# Patient Record
Sex: FEMALE | Race: BLACK OR AFRICAN AMERICAN | NOT HISPANIC OR LATINO
[De-identification: names, ages, dates, MRNs, and addresses within clinical notes are randomized per-mention and may not be internally consistent; named-entity substitution may affect disease eponyms.]

---

## 2017-05-03 ENCOUNTER — APPOINTMENT (OUTPATIENT)
Dept: OBGYN | Facility: CLINIC | Age: 32
End: 2017-05-03

## 2017-05-04 ENCOUNTER — RESULT REVIEW (OUTPATIENT)
Age: 32
End: 2017-05-04

## 2017-05-24 ENCOUNTER — OUTPATIENT (OUTPATIENT)
Dept: OUTPATIENT SERVICES | Facility: HOSPITAL | Age: 32
LOS: 1 days | Discharge: HOME | End: 2017-05-24

## 2017-05-24 ENCOUNTER — APPOINTMENT (OUTPATIENT)
Dept: OBGYN | Facility: CLINIC | Age: 32
End: 2017-05-24

## 2017-05-24 VITALS
BODY MASS INDEX: 22.82 KG/M2 | SYSTOLIC BLOOD PRESSURE: 100 MMHG | DIASTOLIC BLOOD PRESSURE: 60 MMHG | HEIGHT: 65 IN | WEIGHT: 137 LBS

## 2017-06-28 DIAGNOSIS — Z09 ENCOUNTER FOR FOLLOW-UP EXAMINATION AFTER COMPLETED TREATMENT FOR CONDITIONS OTHER THAN MALIGNANT NEOPLASM: ICD-10-CM

## 2017-11-22 ENCOUNTER — APPOINTMENT (OUTPATIENT)
Dept: OBGYN | Facility: CLINIC | Age: 32
End: 2017-11-22

## 2017-12-20 ENCOUNTER — APPOINTMENT (OUTPATIENT)
Dept: OBGYN | Facility: CLINIC | Age: 32
End: 2017-12-20

## 2018-01-17 ENCOUNTER — EMERGENCY (EMERGENCY)
Facility: HOSPITAL | Age: 33
LOS: 0 days | Discharge: HOME | End: 2018-01-17

## 2018-01-17 DIAGNOSIS — O99.89 OTHER SPECIFIED DISEASES AND CONDITIONS COMPLICATING PREGNANCY, CHILDBIRTH AND THE PUERPERIUM: ICD-10-CM

## 2018-01-17 DIAGNOSIS — J32.9 CHRONIC SINUSITIS, UNSPECIFIED: ICD-10-CM

## 2018-01-17 DIAGNOSIS — J02.9 ACUTE PHARYNGITIS, UNSPECIFIED: ICD-10-CM

## 2018-01-17 DIAGNOSIS — R50.9 FEVER, UNSPECIFIED: ICD-10-CM

## 2018-01-24 ENCOUNTER — APPOINTMENT (OUTPATIENT)
Dept: OBGYN | Facility: CLINIC | Age: 33
End: 2018-01-24

## 2018-01-24 ENCOUNTER — RESULT CHARGE (OUTPATIENT)
Age: 33
End: 2018-01-24

## 2018-01-24 ENCOUNTER — OUTPATIENT (OUTPATIENT)
Dept: OUTPATIENT SERVICES | Facility: HOSPITAL | Age: 33
LOS: 1 days | Discharge: HOME | End: 2018-01-24

## 2018-01-24 VITALS — DIASTOLIC BLOOD PRESSURE: 70 MMHG | WEIGHT: 132 LBS | BODY MASS INDEX: 21.97 KG/M2 | SYSTOLIC BLOOD PRESSURE: 100 MMHG

## 2018-01-25 ENCOUNTER — RESULT REVIEW (OUTPATIENT)
Age: 33
End: 2018-01-25

## 2018-01-25 LAB — HCG UR QL: NEGATIVE

## 2018-02-04 DIAGNOSIS — O99.89 OTHER SPECIFIED DISEASES AND CONDITIONS COMPLICATING PREGNANCY, CHILDBIRTH AND THE PUERPERIUM: ICD-10-CM

## 2018-02-14 ENCOUNTER — OUTPATIENT (OUTPATIENT)
Dept: OUTPATIENT SERVICES | Facility: HOSPITAL | Age: 33
LOS: 1 days | Discharge: HOME | End: 2018-02-14

## 2018-02-14 ENCOUNTER — APPOINTMENT (OUTPATIENT)
Dept: OBGYN | Facility: CLINIC | Age: 33
End: 2018-02-14

## 2018-02-14 VITALS — DIASTOLIC BLOOD PRESSURE: 70 MMHG | SYSTOLIC BLOOD PRESSURE: 122 MMHG | WEIGHT: 133 LBS | BODY MASS INDEX: 22.13 KG/M2

## 2018-02-15 DIAGNOSIS — R87.811 VAGINAL HIGH RISK HUMAN PAPILLOMAVIRUS (HPV) DNA TEST POSITIVE: ICD-10-CM

## 2018-07-18 ENCOUNTER — LABORATORY RESULT (OUTPATIENT)
Age: 33
End: 2018-07-18

## 2018-07-18 ENCOUNTER — RESULT CHARGE (OUTPATIENT)
Age: 33
End: 2018-07-18

## 2018-07-18 ENCOUNTER — OUTPATIENT (OUTPATIENT)
Dept: OUTPATIENT SERVICES | Facility: HOSPITAL | Age: 33
LOS: 1 days | Discharge: HOME | End: 2018-07-18

## 2018-07-18 ENCOUNTER — APPOINTMENT (OUTPATIENT)
Dept: OBGYN | Facility: CLINIC | Age: 33
End: 2018-07-18

## 2018-07-18 VITALS — WEIGHT: 133 LBS | SYSTOLIC BLOOD PRESSURE: 116 MMHG | DIASTOLIC BLOOD PRESSURE: 70 MMHG | BODY MASS INDEX: 22.13 KG/M2

## 2018-07-19 DIAGNOSIS — N87.1 MODERATE CERVICAL DYSPLASIA: ICD-10-CM

## 2018-07-19 LAB
HCG UR QL: NEGATIVE
QUALITY CONTROL: YES

## 2018-08-08 ENCOUNTER — OUTPATIENT (OUTPATIENT)
Dept: OUTPATIENT SERVICES | Facility: HOSPITAL | Age: 33
LOS: 1 days | Discharge: HOME | End: 2018-08-08

## 2018-08-08 ENCOUNTER — APPOINTMENT (OUTPATIENT)
Dept: OBGYN | Facility: CLINIC | Age: 33
End: 2018-08-08

## 2018-08-08 VITALS
DIASTOLIC BLOOD PRESSURE: 68 MMHG | SYSTOLIC BLOOD PRESSURE: 116 MMHG | BODY MASS INDEX: 21.66 KG/M2 | WEIGHT: 130 LBS | HEIGHT: 65 IN

## 2018-08-08 DIAGNOSIS — O34.41 MATERNAL CARE FOR OTHER ABNORMALITIES OF CERVIX, FIRST TRIMESTER: ICD-10-CM

## 2018-08-08 DIAGNOSIS — Z98.890 MATERNAL CARE FOR OTHER ABNORMALITIES OF CERVIX, FIRST TRIMESTER: ICD-10-CM

## 2018-08-09 DIAGNOSIS — N72 INFLAMMATORY DISEASE OF CERVIX UTERI: ICD-10-CM

## 2018-12-04 ENCOUNTER — APPOINTMENT (OUTPATIENT)
Dept: OBGYN | Facility: CLINIC | Age: 33
End: 2018-12-04
Payer: MEDICAID

## 2018-12-04 PROCEDURE — 76817 TRANSVAGINAL US OBSTETRIC: CPT

## 2018-12-04 PROCEDURE — 99215 OFFICE O/P EST HI 40 MIN: CPT | Mod: 25

## 2018-12-18 ENCOUNTER — APPOINTMENT (OUTPATIENT)
Dept: OBGYN | Facility: CLINIC | Age: 33
End: 2018-12-18
Payer: MEDICAID

## 2018-12-18 PROCEDURE — 76801 OB US < 14 WKS SINGLE FETUS: CPT

## 2019-01-08 ENCOUNTER — APPOINTMENT (OUTPATIENT)
Dept: OBGYN | Facility: CLINIC | Age: 34
End: 2019-01-08
Payer: MEDICAID

## 2019-01-08 PROCEDURE — 0502F SUBSEQUENT PRENATAL CARE: CPT

## 2019-01-15 ENCOUNTER — APPOINTMENT (OUTPATIENT)
Dept: ANTEPARTUM | Facility: CLINIC | Age: 34
End: 2019-01-15

## 2019-01-15 ENCOUNTER — OUTPATIENT (OUTPATIENT)
Dept: OUTPATIENT SERVICES | Facility: HOSPITAL | Age: 34
LOS: 1 days | Discharge: HOME | End: 2019-01-15

## 2019-01-15 DIAGNOSIS — Z36.82 ENCOUNTER FOR ANTENATAL SCREENING FOR NUCHAL TRANSLUCENCY: ICD-10-CM

## 2019-01-15 DIAGNOSIS — O35.1XX1 MATERNAL CARE FOR (SUSPECTED) CHROMOSOMAL ABNORMALITY IN FETUS, FETUS 1: ICD-10-CM

## 2019-01-15 DIAGNOSIS — Z3A.12 12 WEEKS GESTATION OF PREGNANCY: ICD-10-CM

## 2019-02-05 ENCOUNTER — APPOINTMENT (OUTPATIENT)
Dept: OBGYN | Facility: CLINIC | Age: 34
End: 2019-02-05
Payer: MEDICAID

## 2019-02-05 PROCEDURE — 0502F SUBSEQUENT PRENATAL CARE: CPT

## 2019-02-20 ENCOUNTER — OUTPATIENT (OUTPATIENT)
Dept: OUTPATIENT SERVICES | Facility: HOSPITAL | Age: 34
LOS: 1 days | Discharge: HOME | End: 2019-02-20

## 2019-02-20 DIAGNOSIS — Z02.9 ENCOUNTER FOR ADMINISTRATIVE EXAMINATIONS, UNSPECIFIED: ICD-10-CM

## 2019-02-28 ENCOUNTER — OUTPATIENT (OUTPATIENT)
Dept: OUTPATIENT SERVICES | Facility: HOSPITAL | Age: 34
LOS: 1 days | Discharge: HOME | End: 2019-02-28

## 2019-02-28 VITALS — HEART RATE: 110 BPM | DIASTOLIC BLOOD PRESSURE: 83 MMHG | SYSTOLIC BLOOD PRESSURE: 126 MMHG | RESPIRATION RATE: 20 BRPM

## 2019-02-28 VITALS — TEMPERATURE: 99 F

## 2019-02-28 DIAGNOSIS — M41.9 SCOLIOSIS, UNSPECIFIED: Chronic | ICD-10-CM

## 2019-02-28 NOTE — OB PROVIDER TRIAGE NOTE - NS_OBGYNHISTORY_OBGYN_ALL_OB_FT
OB: NSVDX3 @[facility], largest baby 7lbs. No complications  Gyn: Denies history of abnormal papsmears, STIs, uterine fibroids or ovarian cysts. HPV+ 2017, Colpo normal.

## 2019-02-28 NOTE — OB PROVIDER TRIAGE NOTE - NSHPPHYSICALEXAM_GEN_ALL_CORE
Vital Signs Last 24 Hrs  T(C): 37.4 (28 Feb 2019 22:00), Max: 37.4 (28 Feb 2019 22:00)  T(F): 99.3 (28 Feb 2019 22:00), Max: 99.3 (28 Feb 2019 22:00)  HR: 110 (28 Feb 2019 22:21) (110 - 110)  BP: 126/83 (28 Feb 2019 22:21) (126/83 - 126/83)  RR: 20 (28 Feb 2019 22:21) (20 - 20)    FHR 150bpm per sonogram  SVE deferred    gen: A+OX3. NAD  Abd: Soft, LLQ tenderness to moderate palpation, left sided rovsings and obturator sign positive. Nondistended. no rigidity. no rebound tenderness. No CVA tenderness.   LE: +1 LE edema. Vital Signs Last 24 Hrs  T(C): 37.4 (28 Feb 2019 22:00), Max: 37.4 (28 Feb 2019 22:00)  T(F): 99.3 (28 Feb 2019 22:00), Max: 99.3 (28 Feb 2019 22:00)  HR: 110 (28 Feb 2019 22:21) (110 - 110)  BP: 126/83 (28 Feb 2019 22:21) (126/83 - 126/83)  RR: 20 (28 Feb 2019 22:21) (20 - 20)    FHR 150bpm per sonogram  SVE deferred    UDIP: ketones+    gen: A+OX3. NAD  Abd: Soft, LLQ tenderness to moderate palpation. Nondistended. no rigidity. no rebound tenderness. No CVA tenderness. No suprapubic tenderness on exam.   LE: +1 LE edema, equal bilaterally. Vital Signs Last 24 Hrs  T(C): 37.4 (28 Feb 2019 22:00), Max: 37.4 (28 Feb 2019 22:00)  T(F): 99.3 (28 Feb 2019 22:00), Max: 99.3 (28 Feb 2019 22:00)  HR: 110 (28 Feb 2019 22:21) (110 - 110)  BP: 126/83 (28 Feb 2019 22:21) (126/83 - 126/83)  RR: 20 (28 Feb 2019 22:21) (20 - 20)    Negative for consitutional, heent, cardio, resp, gi, gu, ext, skin, neuro, psychiatric symptoms    FHR 150bpm per sonogram  SVE Pt refusing    UDIP: ketones+    gen: A+OX3. NAD  Abd: Soft, LLQ tenderness to moderate palpation. Nondistended. no rigidity. no rebound tenderness. No CVA tenderness. No suprapubic tenderness on exam.   LE: +1 LE edema, equal bilaterally.

## 2019-02-28 NOTE — OB PROVIDER TRIAGE NOTE - NSOBPROVIDERNOTE_OBGYN_ALL_OB_FT
33yo 33yo , at 18w3d, with LLQ pain, no concern for  labor at this time, rule out other GI or infectious etiology.  -CBC, UA, Urine culture  -Official sonogram   -IV Fluid hydration    Dr. Mcnair aware, Dr. Garcia to be aware 35yo , at 18w3d, with LLQ pain, no concern for  labor at this time, rule out other GI or infectious etiology.  -CBC, UA, Urine culture  -Official sonogram   -IV Fluid hydration    Dr. Mcnair aware, Dr. Garcia to be aware 33yo , at 18w3d, with LLQ pain, rule out  labor vs other GI or infectious etiology.  -CBC, UA, Urine culture  -IV Fluid hydration    Dr. Mcnair aware, Dr. Garcia to be aware 33yo , at 18w3d, with intermittent LLQ pain, worse with ambulation, now resolved since arrival to L+D, not in  labor, likely musculoskeletal in etiology.   -rest, ice, heating packs, and tylenol PRN  -Discharge with labor precautions  -Follow up with Dr. Lubin on    -Fetal Kick Counts encouraged     Dr. Lubin aware, Dr. Mcnair aware 33yo , at 18w3d, with intermittent LLQ pain, worse with ambulation, now resolved since arrival to L+D, not in  labor, likely musculoskeletal in etiology.   -rest, ice, heating packs, and tylenol PRN  -Discharge with labor precautions  -Follow up with Dr. Lubin on    -Fetal Kick Counts encouraged     Dr. Lubin aware, Dr. Mcnair aware    Attending: called to review case  pt seen and examined with resident and agree with above plan of care  pt refusing vaginal exam-incomplete assessment due to this  considering pain is not present now, low suspicion for serious pathology. Pt made aware ab can be misses due to lack of ve  precautions given  sono +FH

## 2019-02-28 NOTE — OB PROVIDER TRIAGE NOTE - HISTORY OF PRESENT ILLNESS
35yo , at 18w3d dated by 1st trimester sonogram, with back pain and cramping. She reports the back pain started yesterday morning, intermittent accompanied by suprapubic pain this morning, both intermittent. She reports the pain at 6/10 currently, sharp, this morning was 10/10. No relief with tylenol, some relief with sitting up, and made worse by laying down and ambulation. She reports nausea that started today, but not vomiting. Reports some SOB with ambulation accompanied by sweating and a feverish feeling. Denies HA, vision changes, CP, SOB, RUQ pain/ Epigastric pain, vomiting, LE pain/ swelling, diarrhea, pain/difficulty with urination, recorded fevers or chills. Last sexual intercourse 2 weeks ago and last PO intake 2100, pasta. Last bowel movement today, normal, denies melena or BRBPR.  Reports passing gas all day. No complications this pregnancy. Denies LOF, vaginal bleeding and denies fetal movement as of yet. 33yo , at 18w3d dated by 1st trimester sonogram, with back pain and cramping. She reports the back pain started yesterday morning, intermittent accompanied by suprapubic pain this morning, both intermittent. She reports the pain has since resolved, and was previously 6/10. No relief with tylenol, some relief with sitting up, and made worse by laying down and ambulation. She reports nausea that started today, but not vomiting. Reports some SOB with ambulation accompanied by sweating and a feverish feeling. Denies HA, vision changes, CP, SOB, RUQ pain/ Epigastric pain, vomiting, LE pain/ swelling, diarrhea, pain/difficulty with urination, recorded fevers or chills. Last sexual intercourse 2 weeks ago and last PO intake 2100, pasta. Last bowel movement today, normal, denies melena or BRBPR.  Reports passing gas all day. No complications this pregnancy. Denies LOF, vaginal bleeding and denies fetal movement as of yet. 35yo , at 18w3d dated by 1st trimester sonogram, with back pain and cramping. She reports the back pain started yesterday morning, intermittent accompanied by suprapubic pain this morning, both intermittent. She reports the pain has since resolved, and was previously 6/10. No relief with tylenol, some relief with sitting up, and made worse by laying down and ambulation. She reports nausea that started today, but not vomiting. Denies HA, vision changes, CP, SOB, RUQ pain/ Epigastric pain, vomiting, LE pain/ swelling, diarrhea, pain/difficulty with urination, recorded fevers or chills. Last sexual intercourse 2 weeks ago and last PO intake 2100, pasta. Last bowel movement today, normal, denies melena or BRBPR.  Reports passing gas all day. No complications this pregnancy. Denies LOF, vaginal bleeding and denies fetal movement as of yet.

## 2019-03-05 ENCOUNTER — APPOINTMENT (OUTPATIENT)
Dept: OBGYN | Facility: CLINIC | Age: 34
End: 2019-03-05
Payer: MEDICAID

## 2019-03-05 PROBLEM — M41.9 SCOLIOSIS, UNSPECIFIED: Chronic | Status: ACTIVE | Noted: 2019-02-28

## 2019-03-05 PROCEDURE — 0502F SUBSEQUENT PRENATAL CARE: CPT

## 2019-03-12 ENCOUNTER — OUTPATIENT (OUTPATIENT)
Dept: OUTPATIENT SERVICES | Facility: HOSPITAL | Age: 34
LOS: 1 days | Discharge: HOME | End: 2019-03-12

## 2019-03-12 ENCOUNTER — APPOINTMENT (OUTPATIENT)
Dept: ANTEPARTUM | Facility: CLINIC | Age: 34
End: 2019-03-12

## 2019-03-12 DIAGNOSIS — M41.9 SCOLIOSIS, UNSPECIFIED: Chronic | ICD-10-CM

## 2019-04-02 ENCOUNTER — APPOINTMENT (OUTPATIENT)
Dept: OBGYN | Facility: CLINIC | Age: 34
End: 2019-04-02
Payer: MEDICAID

## 2019-04-02 PROCEDURE — 0502F SUBSEQUENT PRENATAL CARE: CPT

## 2019-04-28 ENCOUNTER — OUTPATIENT (OUTPATIENT)
Dept: OUTPATIENT SERVICES | Facility: HOSPITAL | Age: 34
LOS: 1 days | Discharge: HOME | End: 2019-04-28

## 2019-04-28 VITALS — HEART RATE: 85 BPM | SYSTOLIC BLOOD PRESSURE: 113 MMHG | DIASTOLIC BLOOD PRESSURE: 70 MMHG

## 2019-04-28 VITALS — TEMPERATURE: 98 F

## 2019-04-28 DIAGNOSIS — M41.9 SCOLIOSIS, UNSPECIFIED: Chronic | ICD-10-CM

## 2019-04-28 NOTE — OB PROVIDER TRIAGE NOTE - NSHPPHYSICALEXAM_GEN_ALL_CORE
Physical exam:    Vital Signs Last 24 Hrs  T(F): 98.4 (28 Apr 2019 19:54), Max: 98.4 (28 Apr 2019 19:54)  HR: 85 (28 Apr 2019 21:02) (85 - 110)  BP: 113/70 (28 Apr 2019 21:02) (107/67 - 139/89)  RR: 16 (28 Apr 2019 20:01) (16 - 16)  Udip mod leuks  Gen: NAD  Abdomen: Soft, nontender, gravid.   Speculum: cervix appears closed. White thick discharge- candida appearing, foul smelling seen in the vaginal vault. no lesion seen  SVE cl/l/p, variable lie, ant placenta, MVP 6.22cm, CL 4.50cm  EFM: 140/mod/+accels  toco: no contractions

## 2019-04-28 NOTE — OB PROVIDER TRIAGE NOTE - NSOBPROVIDERNOTE_OBGYN_ALL_OB_FT
35yo  at 26w6d by LMP, GBS unknown, with likely candida infection given symptoms and exam findings, not in  labor    -Terazole for 7 days sent to pharmacy  -PTL precautions given  -FKC counseling given  -Discharge home  -Follow up with PMD on     Dr. Leonard and Dr. jacinto aware

## 2019-04-28 NOTE — OB PROVIDER TRIAGE NOTE - HISTORY OF PRESENT ILLNESS
33yo  at 26w6d with EDC 19 dated by LMP  c/w first trimester sonogram per patient, presents to labor and delivery for discharge and itching. She reports white, cottage cheese appearing, foul smelling discharge since Friday, with vaginal pruritis since then worsening in intensity that patient opted to come to Triage and not PMD's office on Tuesday. She denies contractions, leakage of fluid, vaginal bleeding. Good fetal movement. Denies fevers, chills, dysuria, changes in bowel habits, N/V, extremity swelling or pain. Denies any complications in this pregnancy.

## 2019-04-28 NOTE — OB PROVIDER TRIAGE NOTE - NS_OBGYNHISTORY_OBGYN_ALL_OB_FT
OBHx  NSVDx3  full term, no complications, 7lbs    Gynhx  Hx of abnormal pap smears s/p LEEP in 2017 HSIL, endocervical negative for dysplasia  Denies fibroids, ovarian cysts, STIs

## 2019-04-30 ENCOUNTER — APPOINTMENT (OUTPATIENT)
Dept: OBGYN | Facility: CLINIC | Age: 34
End: 2019-04-30
Payer: MEDICAID

## 2019-04-30 PROCEDURE — 0502F SUBSEQUENT PRENATAL CARE: CPT

## 2019-05-14 ENCOUNTER — OUTPATIENT (OUTPATIENT)
Dept: OUTPATIENT SERVICES | Facility: HOSPITAL | Age: 34
LOS: 1 days | Discharge: HOME | End: 2019-05-14

## 2019-05-14 DIAGNOSIS — R73.09 OTHER ABNORMAL GLUCOSE: ICD-10-CM

## 2019-05-14 DIAGNOSIS — M41.9 SCOLIOSIS, UNSPECIFIED: Chronic | ICD-10-CM

## 2019-05-20 ENCOUNTER — APPOINTMENT (OUTPATIENT)
Dept: INTERNAL MEDICINE | Facility: CLINIC | Age: 34
End: 2019-05-20

## 2019-05-28 ENCOUNTER — APPOINTMENT (OUTPATIENT)
Dept: OBGYN | Facility: CLINIC | Age: 34
End: 2019-05-28
Payer: MEDICAID

## 2019-05-28 PROCEDURE — 0502F SUBSEQUENT PRENATAL CARE: CPT

## 2019-06-11 ENCOUNTER — APPOINTMENT (OUTPATIENT)
Dept: OBGYN | Facility: CLINIC | Age: 34
End: 2019-06-11
Payer: MEDICAID

## 2019-06-11 PROCEDURE — 0502F SUBSEQUENT PRENATAL CARE: CPT

## 2019-06-25 ENCOUNTER — APPOINTMENT (OUTPATIENT)
Dept: OBGYN | Facility: CLINIC | Age: 34
End: 2019-06-25
Payer: MEDICAID

## 2019-06-25 PROCEDURE — 0502F SUBSEQUENT PRENATAL CARE: CPT

## 2019-07-08 ENCOUNTER — APPOINTMENT (OUTPATIENT)
Dept: OBGYN | Facility: CLINIC | Age: 34
End: 2019-07-08

## 2019-07-09 ENCOUNTER — APPOINTMENT (OUTPATIENT)
Dept: OBGYN | Facility: CLINIC | Age: 34
End: 2019-07-09
Payer: MEDICAID

## 2019-07-09 PROCEDURE — 0502F SUBSEQUENT PRENATAL CARE: CPT

## 2019-07-15 ENCOUNTER — APPOINTMENT (OUTPATIENT)
Dept: OBGYN | Facility: CLINIC | Age: 34
End: 2019-07-15
Payer: MEDICAID

## 2019-07-15 PROCEDURE — 0502F SUBSEQUENT PRENATAL CARE: CPT

## 2019-07-22 ENCOUNTER — APPOINTMENT (OUTPATIENT)
Dept: OBGYN | Facility: CLINIC | Age: 34
End: 2019-07-22
Payer: MEDICAID

## 2019-07-22 PROCEDURE — 0502F SUBSEQUENT PRENATAL CARE: CPT

## 2019-07-30 ENCOUNTER — APPOINTMENT (OUTPATIENT)
Dept: OBGYN | Facility: CLINIC | Age: 34
End: 2019-07-30
Payer: MEDICAID

## 2019-07-30 PROCEDURE — 0502F SUBSEQUENT PRENATAL CARE: CPT

## 2019-07-30 PROCEDURE — 59426 ANTEPARTUM CARE ONLY: CPT

## 2019-07-31 ENCOUNTER — INPATIENT (INPATIENT)
Facility: HOSPITAL | Age: 34
LOS: 1 days | Discharge: HOME HEALTH | End: 2019-08-02
Attending: OBSTETRICS & GYNECOLOGY | Admitting: OBSTETRICS & GYNECOLOGY
Payer: MEDICAID

## 2019-07-31 VITALS
DIASTOLIC BLOOD PRESSURE: 86 MMHG | SYSTOLIC BLOOD PRESSURE: 141 MMHG | TEMPERATURE: 99 F | HEART RATE: 92 BPM | RESPIRATION RATE: 19 BRPM

## 2019-07-31 DIAGNOSIS — M41.9 SCOLIOSIS, UNSPECIFIED: Chronic | ICD-10-CM

## 2019-07-31 LAB
ALBUMIN SERPL ELPH-MCNC: 3.5 G/DL — SIGNIFICANT CHANGE UP (ref 3.5–5.2)
ALP SERPL-CCNC: 129 U/L — HIGH (ref 30–115)
ALT FLD-CCNC: 10 U/L — SIGNIFICANT CHANGE UP (ref 0–41)
ANION GAP SERPL CALC-SCNC: 14 MMOL/L — SIGNIFICANT CHANGE UP (ref 7–14)
APPEARANCE UR: CLEAR — SIGNIFICANT CHANGE UP
AST SERPL-CCNC: 27 U/L — SIGNIFICANT CHANGE UP (ref 0–41)
BACTERIA # UR AUTO: NEGATIVE — SIGNIFICANT CHANGE UP
BASOPHILS # BLD AUTO: 0.02 K/UL — SIGNIFICANT CHANGE UP (ref 0–0.2)
BASOPHILS NFR BLD AUTO: 0.2 % — SIGNIFICANT CHANGE UP (ref 0–1)
BILIRUB SERPL-MCNC: 0.2 MG/DL — SIGNIFICANT CHANGE UP (ref 0.2–1.2)
BILIRUB UR-MCNC: NEGATIVE — SIGNIFICANT CHANGE UP
BLD GP AB SCN SERPL QL: SIGNIFICANT CHANGE UP
BUN SERPL-MCNC: 6 MG/DL — LOW (ref 10–20)
CALCIUM SERPL-MCNC: 9.4 MG/DL — SIGNIFICANT CHANGE UP (ref 8.5–10.1)
CHLORIDE SERPL-SCNC: 105 MMOL/L — SIGNIFICANT CHANGE UP (ref 98–110)
CO2 SERPL-SCNC: 19 MMOL/L — SIGNIFICANT CHANGE UP (ref 17–32)
COLOR SPEC: SIGNIFICANT CHANGE UP
CREAT SERPL-MCNC: 0.5 MG/DL — LOW (ref 0.7–1.5)
DIFF PNL FLD: ABNORMAL
EOSINOPHIL # BLD AUTO: 0.06 K/UL — SIGNIFICANT CHANGE UP (ref 0–0.7)
EOSINOPHIL NFR BLD AUTO: 0.5 % — SIGNIFICANT CHANGE UP (ref 0–8)
EPI CELLS # UR: 2 /HPF — SIGNIFICANT CHANGE UP (ref 0–5)
GLUCOSE SERPL-MCNC: 78 MG/DL — SIGNIFICANT CHANGE UP (ref 70–99)
GLUCOSE UR QL: NEGATIVE — SIGNIFICANT CHANGE UP
HCT VFR BLD CALC: 34.6 % — LOW (ref 37–47)
HGB BLD-MCNC: 11.6 G/DL — LOW (ref 12–16)
HIV 1 & 2 AB SERPL IA.RAPID: SIGNIFICANT CHANGE UP
HYALINE CASTS # UR AUTO: 0 /LPF — SIGNIFICANT CHANGE UP (ref 0–7)
IMM GRANULOCYTES NFR BLD AUTO: 0.8 % — HIGH (ref 0.1–0.3)
KETONES UR-MCNC: NEGATIVE — SIGNIFICANT CHANGE UP
LDH SERPL L TO P-CCNC: 351 — HIGH (ref 50–242)
LEUKOCYTE ESTERASE UR-ACNC: NEGATIVE — SIGNIFICANT CHANGE UP
LYMPHOCYTES # BLD AUTO: 1.37 K/UL — SIGNIFICANT CHANGE UP (ref 1.2–3.4)
LYMPHOCYTES # BLD AUTO: 10.5 % — LOW (ref 20.5–51.1)
MCHC RBC-ENTMCNC: 30.6 PG — SIGNIFICANT CHANGE UP (ref 27–31)
MCHC RBC-ENTMCNC: 33.5 G/DL — SIGNIFICANT CHANGE UP (ref 32–37)
MCV RBC AUTO: 91.3 FL — SIGNIFICANT CHANGE UP (ref 81–99)
MONOCYTES # BLD AUTO: 0.87 K/UL — HIGH (ref 0.1–0.6)
MONOCYTES NFR BLD AUTO: 6.6 % — SIGNIFICANT CHANGE UP (ref 1.7–9.3)
NEUTROPHILS # BLD AUTO: 10.66 K/UL — HIGH (ref 1.4–6.5)
NEUTROPHILS NFR BLD AUTO: 81.4 % — HIGH (ref 42.2–75.2)
NITRITE UR-MCNC: NEGATIVE — SIGNIFICANT CHANGE UP
NRBC # BLD: 0 /100 WBCS — SIGNIFICANT CHANGE UP (ref 0–0)
PH UR: 7.5 — SIGNIFICANT CHANGE UP (ref 5–8)
PLATELET # BLD AUTO: 131 K/UL — SIGNIFICANT CHANGE UP (ref 130–400)
POTASSIUM SERPL-MCNC: 4.4 MMOL/L — SIGNIFICANT CHANGE UP (ref 3.5–5)
POTASSIUM SERPL-SCNC: 4.4 MMOL/L — SIGNIFICANT CHANGE UP (ref 3.5–5)
PRENATAL SYPHILIS TEST: SIGNIFICANT CHANGE UP
PROT SERPL-MCNC: 6.8 G/DL — SIGNIFICANT CHANGE UP (ref 6–8)
PROT UR-MCNC: NEGATIVE — SIGNIFICANT CHANGE UP
RBC # BLD: 3.79 M/UL — LOW (ref 4.2–5.4)
RBC # FLD: 14.9 % — HIGH (ref 11.5–14.5)
RBC CASTS # UR COMP ASSIST: 3 /HPF — SIGNIFICANT CHANGE UP (ref 0–4)
SODIUM SERPL-SCNC: 138 MMOL/L — SIGNIFICANT CHANGE UP (ref 135–146)
SP GR SPEC: 1.01 — SIGNIFICANT CHANGE UP (ref 1.01–1.02)
URATE SERPL-MCNC: 4.6 MG/DL — SIGNIFICANT CHANGE UP (ref 2.5–7)
UROBILINOGEN FLD QL: SIGNIFICANT CHANGE UP
WBC # BLD: 13.09 K/UL — HIGH (ref 4.8–10.8)
WBC # FLD AUTO: 13.09 K/UL — HIGH (ref 4.8–10.8)
WBC UR QL: 5 /HPF — SIGNIFICANT CHANGE UP (ref 0–5)

## 2019-07-31 PROCEDURE — 59409 OBSTETRICAL CARE: CPT | Mod: U9

## 2019-07-31 RX ORDER — DIBUCAINE 1 %
1 OINTMENT (GRAM) RECTAL EVERY 6 HOURS
Refills: 0 | Status: DISCONTINUED | OUTPATIENT
Start: 2019-07-31 | End: 2019-08-02

## 2019-07-31 RX ORDER — KETOROLAC TROMETHAMINE 30 MG/ML
30 SYRINGE (ML) INJECTION ONCE
Refills: 0 | Status: DISCONTINUED | OUTPATIENT
Start: 2019-07-31 | End: 2019-07-31

## 2019-07-31 RX ORDER — BENZOCAINE 10 %
1 GEL (GRAM) MUCOUS MEMBRANE EVERY 6 HOURS
Refills: 0 | Status: DISCONTINUED | OUTPATIENT
Start: 2019-07-31 | End: 2019-08-02

## 2019-07-31 RX ORDER — AMPICILLIN TRIHYDRATE 250 MG
1 CAPSULE ORAL EVERY 4 HOURS
Refills: 0 | Status: DISCONTINUED | OUTPATIENT
Start: 2019-07-31 | End: 2019-07-31

## 2019-07-31 RX ORDER — AER TRAVELER 0.5 G/1
1 SOLUTION RECTAL; TOPICAL EVERY 4 HOURS
Refills: 0 | Status: DISCONTINUED | OUTPATIENT
Start: 2019-07-31 | End: 2019-08-02

## 2019-07-31 RX ORDER — IBUPROFEN 200 MG
600 TABLET ORAL EVERY 6 HOURS
Refills: 0 | Status: COMPLETED | OUTPATIENT
Start: 2019-07-31 | End: 2020-06-28

## 2019-07-31 RX ORDER — MAGNESIUM HYDROXIDE 400 MG/1
30 TABLET, CHEWABLE ORAL
Refills: 0 | Status: DISCONTINUED | OUTPATIENT
Start: 2019-07-31 | End: 2019-08-02

## 2019-07-31 RX ORDER — DOCUSATE SODIUM 100 MG
100 CAPSULE ORAL
Refills: 0 | Status: DISCONTINUED | OUTPATIENT
Start: 2019-07-31 | End: 2019-08-02

## 2019-07-31 RX ORDER — OXYTOCIN 10 UNIT/ML
333.33 VIAL (ML) INJECTION
Qty: 20 | Refills: 0 | Status: DISCONTINUED | OUTPATIENT
Start: 2019-07-31 | End: 2019-08-02

## 2019-07-31 RX ORDER — SODIUM CHLORIDE 9 MG/ML
3 INJECTION INTRAMUSCULAR; INTRAVENOUS; SUBCUTANEOUS EVERY 8 HOURS
Refills: 0 | Status: DISCONTINUED | OUTPATIENT
Start: 2019-07-31 | End: 2019-08-02

## 2019-07-31 RX ORDER — IBUPROFEN 200 MG
600 TABLET ORAL EVERY 6 HOURS
Refills: 0 | Status: DISCONTINUED | OUTPATIENT
Start: 2019-07-31 | End: 2019-08-02

## 2019-07-31 RX ORDER — LANOLIN
1 OINTMENT (GRAM) TOPICAL EVERY 6 HOURS
Refills: 0 | Status: DISCONTINUED | OUTPATIENT
Start: 2019-07-31 | End: 2019-08-02

## 2019-07-31 RX ORDER — DIPHENHYDRAMINE HCL 50 MG
25 CAPSULE ORAL EVERY 6 HOURS
Refills: 0 | Status: DISCONTINUED | OUTPATIENT
Start: 2019-07-31 | End: 2019-08-02

## 2019-07-31 RX ORDER — AMPICILLIN TRIHYDRATE 250 MG
2 CAPSULE ORAL ONCE
Refills: 0 | Status: COMPLETED | OUTPATIENT
Start: 2019-07-31 | End: 2019-07-31

## 2019-07-31 RX ORDER — OXYTOCIN 10 UNIT/ML
41.67 VIAL (ML) INJECTION
Qty: 20 | Refills: 0 | Status: DISCONTINUED | OUTPATIENT
Start: 2019-07-31 | End: 2019-08-02

## 2019-07-31 RX ORDER — BUTORPHANOL TARTRATE 2 MG/ML
2 INJECTION, SOLUTION INTRAMUSCULAR; INTRAVENOUS ONCE
Refills: 0 | Status: DISCONTINUED | OUTPATIENT
Start: 2019-07-31 | End: 2019-07-31

## 2019-07-31 RX ORDER — SIMETHICONE 80 MG/1
80 TABLET, CHEWABLE ORAL EVERY 4 HOURS
Refills: 0 | Status: DISCONTINUED | OUTPATIENT
Start: 2019-07-31 | End: 2019-08-02

## 2019-07-31 RX ORDER — ACETAMINOPHEN 500 MG
975 TABLET ORAL
Refills: 0 | Status: DISCONTINUED | OUTPATIENT
Start: 2019-07-31 | End: 2019-08-02

## 2019-07-31 RX ORDER — OXYCODONE HYDROCHLORIDE 5 MG/1
5 TABLET ORAL ONCE
Refills: 0 | Status: DISCONTINUED | OUTPATIENT
Start: 2019-07-31 | End: 2019-08-02

## 2019-07-31 RX ORDER — GLYCERIN ADULT
1 SUPPOSITORY, RECTAL RECTAL AT BEDTIME
Refills: 0 | Status: DISCONTINUED | OUTPATIENT
Start: 2019-07-31 | End: 2019-08-02

## 2019-07-31 RX ORDER — ACETAMINOPHEN 500 MG
1000 TABLET ORAL ONCE
Refills: 0 | Status: COMPLETED | OUTPATIENT
Start: 2019-07-31 | End: 2019-07-31

## 2019-07-31 RX ORDER — MORPHINE SULFATE 50 MG/1
4 CAPSULE, EXTENDED RELEASE ORAL ONCE
Refills: 0 | Status: DISCONTINUED | OUTPATIENT
Start: 2019-07-31 | End: 2019-07-31

## 2019-07-31 RX ORDER — OXYCODONE HYDROCHLORIDE 5 MG/1
5 TABLET ORAL
Refills: 0 | Status: DISCONTINUED | OUTPATIENT
Start: 2019-07-31 | End: 2019-08-02

## 2019-07-31 RX ORDER — SODIUM CHLORIDE 9 MG/ML
1000 INJECTION, SOLUTION INTRAVENOUS
Refills: 0 | Status: DISCONTINUED | OUTPATIENT
Start: 2019-07-31 | End: 2019-07-31

## 2019-07-31 RX ADMIN — Medication 30 MILLIGRAM(S): at 18:44

## 2019-07-31 RX ADMIN — OXYCODONE HYDROCHLORIDE 5 MILLIGRAM(S): 5 TABLET ORAL at 22:47

## 2019-07-31 RX ADMIN — Medication 216 GRAM(S): at 12:59

## 2019-07-31 RX ADMIN — Medication 400 MILLIGRAM(S): at 14:00

## 2019-07-31 RX ADMIN — MORPHINE SULFATE 4 MILLIGRAM(S): 50 CAPSULE, EXTENDED RELEASE ORAL at 17:27

## 2019-07-31 RX ADMIN — SODIUM CHLORIDE 3 MILLILITER(S): 9 INJECTION INTRAMUSCULAR; INTRAVENOUS; SUBCUTANEOUS at 22:48

## 2019-07-31 RX ADMIN — Medication 30 MILLIGRAM(S): at 19:14

## 2019-07-31 RX ADMIN — SODIUM CHLORIDE 125 MILLILITER(S): 9 INJECTION, SOLUTION INTRAVENOUS at 14:20

## 2019-07-31 RX ADMIN — Medication 108 GRAM(S): at 16:59

## 2019-07-31 RX ADMIN — BUTORPHANOL TARTRATE 2 MILLIGRAM(S): 2 INJECTION, SOLUTION INTRAMUSCULAR; INTRAVENOUS at 14:06

## 2019-07-31 NOTE — OB PROVIDER H&P - ASSESSMENT
35yo  at 40w2d, GBS pos, with hx of scoliosis s/p surgery with adrian placement, in labor    -Admit to labor and delivery  -IV hydration and clear liquid diet  -ampicillin for GBS ppx  -Cont efm and toco  -anesthesia consult  -Pain management PRN      Dr. Vasquez and Dr. Naveen Chaudhari aware

## 2019-07-31 NOTE — PROGRESS NOTE ADULT - SUBJECTIVE AND OBJECTIVE BOX
PGY 2 Note    S: Pt seen and examined at bedside for labor check. Doing well, experiencing pain with contractions.     Vital Signs Last 24 Hrs  T(F): 98.24 (2019 16:14), Max: 99.3 (2019 12:27)  HR: 86 (2019 17:00) (74 - 93)  BP: 144/93 (2019 17:00) (120/79 - 149/82)  RR: 16 (2019 16:14) (16 - 19)    EFM: 130/mod юлия/+accel  TOCO: q2-4min  SVE: 7/90/-1, vtx, AROM clear    Labs:                        11.6   13.09 )-----------( 131      ( 2019 13:02 )             34.6           138  |  105  |  6<L>  ----------------------------<  78  4.4   |  19  |  0.5<L>    Ca    9.4      2019 13:02    TPro  6.8  /  Alb  3.5  /  TBili  0.2  /  DBili  x   /  AST  27  /  ALT  10  /  AlkPhos  129<H>      ABO RH Interpretation: A POS (19 @ 13:02)    Urinalysis Basic - ( 2019 13:35 )    Color: Light Yellow / Appearance: Clear / S.014 / pH: x  Gluc: x / Ketone: Negative  / Bili: Negative / Urobili: <2 mg/dL   Blood: x / Protein: Negative / Nitrite: Negative   Leuk Esterase: Negative / RBC: 3 /HPF / WBC 5 /HPF   Sq Epi: x / Non Sq Epi: 2 /HPF / Bacteria: Negative        Prenatal Syphilis Test: Nonreact (19 @ 13:05)      Meds:  @1259 ampicillin  @1400 stadol

## 2019-07-31 NOTE — OB PROVIDER H&P - NSHPPHYSICALEXAM_GEN_ALL_CORE
Physical exam:    Vital Signs Last 24 Hrs  T(F): 99.3 (31 Jul 2019 12:27), Max: 99.3 (31 Jul 2019 12:27)  HR: 82 (31 Jul 2019 13:15) (81 - 92)  BP: 149/82 (31 Jul 2019 13:15) (131/93 - 149/82)  RR: 19 (31 Jul 2019 12:27) (19 - 19)  Udip large blood  Gen: NAD  Resp: CTA b/l  Neuro: AAOx3, +2 UE reflexes b/l  Abdomen: Soft, nontender, gravid. moderate palpable contractions. EFW 3400g  Ext: No calf tenderness  Speculum: cervix appears 2cm open. membranes seen. scant brown blood noted in the vagina  VE: 4/80/-2 vertex  EFM: 130/mod/+Accels  toco: q 4-6min

## 2019-07-31 NOTE — OB RN DELIVERY SUMMARY - NS_INTRAPARTUMABXTYPE_OBGYN_ALL_OB
GBS specific antibiotics > 2 hrs prior to birth No antibiotics or any antibiotics < 2 hrs prior to birth

## 2019-07-31 NOTE — OB PROVIDER H&P - NS_OBGYNHISTORY_OBGYN_ALL_OB_FT
Obhx  NSVDx3 full term, no complications, denies PIH, PPH and GDM largest 7lbs    GYNhx  hx of abnormal pap smears s/p colposcopy and Leep in 2017  Denies fibroids, ovarian cysts, STIs

## 2019-07-31 NOTE — OB PROVIDER H&P - NSHPLABSRESULTS_GEN_ALL_CORE
GCT 62    Sonogram  8w1d- CRL consisten, FH 168bp  12w1d- ant placenta, NT 1.58mm  20w1d- EFW 385g, transverse, 3vc, ant placenta, MVP 54mm, anatomy wnl

## 2019-07-31 NOTE — OB RN TRIAGE NOTE - NS_OBACUITYLEVEL_OBGYN_ALL_OB
3
pt was found to be hyperglycemic and dehydrated and given fluids and will be discharged with outpatient follow up/. pt stated that he had not taken his meds today

## 2019-07-31 NOTE — CHART NOTE - NSCHARTNOTEFT_GEN_A_CORE
35 yo  in labor  40wk 4cm  Hx Scoliosis s/p correction with William rods  3 previous deliveries without epidural analgesia  Discussed options with patient including obliteration of epidural space  Offered spinal analgesia with R/B/A  Declined  Requests parenteral analgesia 35 yo  in labor  40wk 4cm  Hx Scoliosis s/p correction with William rods  3 previous deliveries without epidural analgesia  Discussed options with patient including obliteration of epidural space during spinal fusion   Offered spinal analgesia with R/B/A  Declined  Requests parenteral analgesia

## 2019-07-31 NOTE — PROGRESS NOTE ADULT - ASSESSMENT
33 yo  @40w2d, GBS pos, measles unknown, h/o scoliosis w/ adrian placement, gHTN r/o preeclampsia, in active labor, doing well.    - will give IV morphine for pain  - cont EFM and toco  - ampicillin for GBS ppx  - clear liquid diet, IV hydration  - monitor BPs  - f/up rubeola, Upr/cr    Dr/ Bhavana and Dr. Chaudhari aware.

## 2019-07-31 NOTE — OB PROVIDER DELIVERY SUMMARY - NSPROVIDERDELIVERYNOTE_OBGYN_ALL_OB_FT
Patient was fully dilated and pushing. Fetal head was OA and restituted to LOT. The anterior and posterior shoulders delivered, followed by the remaining body atraumatically. Delayed cord clamping was performed, and then clamped and cut. Cord blood gases collected x2. The  was handed to the mother and RN. The placenta delivered intact with membranes. Pitocin was administered. Uterus massaged, fundus found to be firm. Cervix, vagina and perineum inspected no lacerations noted.    Viable female infant delivered, with APGARs 9/9.    Dr. Bateman and Dr. Ignacio present for the delivery Patient was fully dilated and pushing. Fetal head was OA and restituted to LOT. The anterior and posterior shoulders delivered, followed by the remaining body atraumatically. Delayed cord clamping was performed, and then clamped and cut. Cord blood gases collected x2. The  was handed to the mother and RN. The placenta delivered intact with membranes. Pitocin was administered. Uterus massaged, fundus found to be firm. Cervix, vagina and perineum inspected no lacerations noted.    Viable female infant delivered, with APGARs 9/9.    Dr. Bateman and Dr. Ignacio present for the delivery    OB ATTENDING: present for uncomplicated delivery

## 2019-08-01 ENCOUNTER — APPOINTMENT (OUTPATIENT)
Dept: OBGYN | Facility: CLINIC | Age: 34
End: 2019-08-01

## 2019-08-01 LAB
BASOPHILS # BLD AUTO: 0.04 K/UL — SIGNIFICANT CHANGE UP (ref 0–0.2)
BASOPHILS NFR BLD AUTO: 0.3 % — SIGNIFICANT CHANGE UP (ref 0–1)
CREAT ?TM UR-MCNC: 49 MG/DL — SIGNIFICANT CHANGE UP
EOSINOPHIL # BLD AUTO: 0.06 K/UL — SIGNIFICANT CHANGE UP (ref 0–0.7)
EOSINOPHIL NFR BLD AUTO: 0.5 % — SIGNIFICANT CHANGE UP (ref 0–8)
HCT VFR BLD CALC: 30.6 % — LOW (ref 37–47)
HGB BLD-MCNC: 10 G/DL — LOW (ref 12–16)
IMM GRANULOCYTES NFR BLD AUTO: 0.7 % — HIGH (ref 0.1–0.3)
LYMPHOCYTES # BLD AUTO: 1.79 K/UL — SIGNIFICANT CHANGE UP (ref 1.2–3.4)
LYMPHOCYTES # BLD AUTO: 15 % — LOW (ref 20.5–51.1)
MCHC RBC-ENTMCNC: 30.4 PG — SIGNIFICANT CHANGE UP (ref 27–31)
MCHC RBC-ENTMCNC: 32.7 G/DL — SIGNIFICANT CHANGE UP (ref 32–37)
MCV RBC AUTO: 93 FL — SIGNIFICANT CHANGE UP (ref 81–99)
MEV IGG SER-ACNC: 183 AU/ML — SIGNIFICANT CHANGE UP
MEV IGG+IGM SER-IMP: POSITIVE — SIGNIFICANT CHANGE UP
MONOCYTES # BLD AUTO: 0.83 K/UL — HIGH (ref 0.1–0.6)
MONOCYTES NFR BLD AUTO: 7 % — SIGNIFICANT CHANGE UP (ref 1.7–9.3)
NEUTROPHILS # BLD AUTO: 9.1 K/UL — HIGH (ref 1.4–6.5)
NEUTROPHILS NFR BLD AUTO: 76.5 % — HIGH (ref 42.2–75.2)
NRBC # BLD: 0 /100 WBCS — SIGNIFICANT CHANGE UP (ref 0–0)
PLATELET # BLD AUTO: 123 K/UL — LOW (ref 130–400)
PROT ?TM UR-MCNC: 17 MG/DLG/24H — SIGNIFICANT CHANGE UP
PROT/CREAT UR-RTO: 0.3 RATIO — HIGH (ref 0–0.2)
RBC # BLD: 3.29 M/UL — LOW (ref 4.2–5.4)
RBC # FLD: 14.9 % — HIGH (ref 11.5–14.5)
WBC # BLD: 11.9 K/UL — HIGH (ref 4.8–10.8)
WBC # FLD AUTO: 11.9 K/UL — HIGH (ref 4.8–10.8)

## 2019-08-01 PROCEDURE — 99231 SBSQ HOSP IP/OBS SF/LOW 25: CPT

## 2019-08-01 RX ADMIN — Medication 975 MILLIGRAM(S): at 02:39

## 2019-08-01 RX ADMIN — Medication 600 MILLIGRAM(S): at 06:18

## 2019-08-01 RX ADMIN — Medication 975 MILLIGRAM(S): at 08:19

## 2019-08-01 RX ADMIN — OXYCODONE HYDROCHLORIDE 5 MILLIGRAM(S): 5 TABLET ORAL at 16:37

## 2019-08-01 RX ADMIN — SODIUM CHLORIDE 3 MILLILITER(S): 9 INJECTION INTRAMUSCULAR; INTRAVENOUS; SUBCUTANEOUS at 06:17

## 2019-08-01 RX ADMIN — Medication 975 MILLIGRAM(S): at 20:28

## 2019-08-01 RX ADMIN — OXYCODONE HYDROCHLORIDE 5 MILLIGRAM(S): 5 TABLET ORAL at 11:07

## 2019-08-01 RX ADMIN — SODIUM CHLORIDE 3 MILLILITER(S): 9 INJECTION INTRAMUSCULAR; INTRAVENOUS; SUBCUTANEOUS at 13:56

## 2019-08-01 RX ADMIN — Medication 1 TABLET(S): at 11:09

## 2019-08-01 RX ADMIN — Medication 600 MILLIGRAM(S): at 23:18

## 2019-08-01 NOTE — PROGRESS NOTE ADULT - ASSESSMENT
34y P4 s/p  PPD#1, h/o scoliosis w/ adrian placement. gHTN r/o preeclampsia, BPs now well controlled, doing well.    - Continue current management  - Pain mgt prn  - Encourage ambulation, oral hydration  - monitor BPs  - f/u urine pr/cr    Dr. Vasquez and Dr. Haley to be made aware.

## 2019-08-01 NOTE — PROGRESS NOTE ADULT - SUBJECTIVE AND OBJECTIVE BOX
PGY 1 Note:    Pt doing well, pain well controlled. Denies heavy vaginal bleeding. No overnight events, no acute complaints. Ambulating, voiding, bottle feeding, tolerating regular diet. Denies headaches, vision changes, SOB, chest pain, RUQ/epigastric pain, LE pain/swelling.    PAST MEDICAL & SURGICAL HISTORY:  Scoliosis  Lumbar scoliosis: Yassine placement as child      Physical Exam  Vital Signs Last 24 Hrs  T(F): 98.4 (31 Jul 2019 23:39), Max: 99.3 (31 Jul 2019 12:27)  HR: 91 (31 Jul 2019 23:39) (66 - 93)  BP: 105/60 (01 Aug 2019 03:29) (105/60 - 149/82)  RR: 18 (31 Jul 2019 23:39) (16 - 19)    Gen: AAOx3, NAD  Abd: Soft, nontender, nondistended, BS+, no RUQ/epigastric tenderness  Fundus: Firm, nontender, below the umbilicus  Lochia: Normal  Neuro: 2+ reflexes in upper extremities b/l  Ext: No calf tenderness, no swelling    Labs:                        11.6   13.09 )-----------( 131      ( 31 Jul 2019 13:02 )             34.6

## 2019-08-02 ENCOUNTER — TRANSCRIPTION ENCOUNTER (OUTPATIENT)
Age: 34
End: 2019-08-02

## 2019-08-02 VITALS
SYSTOLIC BLOOD PRESSURE: 120 MMHG | DIASTOLIC BLOOD PRESSURE: 59 MMHG | RESPIRATION RATE: 18 BRPM | TEMPERATURE: 98 F | HEART RATE: 84 BPM

## 2019-08-02 PROCEDURE — 99238 HOSP IP/OBS DSCHRG MGMT 30/<: CPT

## 2019-08-02 RX ORDER — BENZOCAINE 10 %
1 GEL (GRAM) MUCOUS MEMBRANE
Qty: 0 | Refills: 0 | DISCHARGE
Start: 2019-08-02

## 2019-08-02 RX ORDER — IBUPROFEN 200 MG
1 TABLET ORAL
Qty: 0 | Refills: 0 | DISCHARGE
Start: 2019-08-02

## 2019-08-02 RX ORDER — ACETAMINOPHEN 500 MG
3 TABLET ORAL
Qty: 0 | Refills: 0 | DISCHARGE
Start: 2019-08-02

## 2019-08-02 RX ADMIN — Medication 600 MILLIGRAM(S): at 06:03

## 2019-08-02 RX ADMIN — Medication 975 MILLIGRAM(S): at 03:54

## 2019-08-02 RX ADMIN — Medication 975 MILLIGRAM(S): at 08:56

## 2019-08-02 NOTE — DISCHARGE NOTE OB - HOSPITAL COURSE
Patient admitted in labor, underwent vaginal delivery complicated by preeclampsia without severe features, and had an uncomplicated postpartum course, discharged on PPD 2.

## 2019-08-02 NOTE — DISCHARGE NOTE OB - PATIENT PORTAL LINK FT
You can access the [facility] Portal, offered by [facility], by registering with the following website: [email or web address]

## 2019-08-02 NOTE — PROGRESS NOTE ADULT - ASSESSMENT
33yo P4 s/p  PPD#2, complicated by preeclampsia w/o severe features, BPs now well controlled.    - Continue current management  - Pain mgt prn  - Encourage ambulation, oral hydration  - monitor BPs  - discharge home today with VNS services  - follow up in 1 week for blood pressure check and in 6 weeks for postpartum visit    Dr. Vasquez and Dr. Forte to be made aware.

## 2019-08-02 NOTE — PROGRESS NOTE ADULT - SUBJECTIVE AND OBJECTIVE BOX
PGY 1 Note:    Pt doing well, pain well controlled. Denies heavy vaginal bleeding. No overnight events, no acute complaints. Pt is voiding, ambulating without difficulting, and tolerating regular diet. Bottle feeding. Denies headaches, vision changes, SOB, chest pain, RUQ/epigastric pain, LE pain/swelling.       PAST MEDICAL & SURGICAL HISTORY:  Scoliosis  Lumbar scoliosis: Yassine placement as child      Physical Exam  Vital Signs Last 24 Hrs  T(F): 98.1 (01 Aug 2019 23:35), Max: 98.1 (01 Aug 2019 15:38)  HR: 81 (01 Aug 2019 23:35) (72 - 81)  BP: 101/56 (01 Aug 2019 23:35) (101/56 - 105/61)  RR: 18 (01 Aug 2019 23:35) (18 - 18)    Gen: AAOx3, NAD  CVS: RRR  Chest: CTAB  Abd: Soft, nontender, nondistended, BS+, no RUQ/epigastric tenderness  Fundus: Firm, nontender, below the umbilicus  Lochia: Normal  Neuro: Reflexes 2+ b/l  Ext: No calf tenderness, no swelling    Labs:                        10.0   11.90 )-----------( 123      ( 01 Aug 2019 11:34 )             30.6                         11.6   13.09 )-----------( 131      ( 31 Jul 2019 13:02 )             34.6

## 2019-08-02 NOTE — DISCHARGE NOTE OB - ADDITIONAL INSTRUCTIONS
No heavy lifting.   Nothing inside the vagina for 6 weeks: no tampons, douching, sexual intercourse, tub baths or pools.   If you have a fever over 100.4F, severe abdominal pain or heavy vaginal bleeding please call your doctor or go to the emergency room.  Please follow up with your OBGYN in 1 week for a blood pressure check, and 6 weeks for a postpartum visit.

## 2019-08-02 NOTE — DISCHARGE NOTE OB - CARE PLAN
Principal Discharge DX:	Vaginal delivery  Goal:	Healthy patient and baby  Assessment and plan of treatment:	No heavy lifting.   Nothing inside the vagina for 6 weeks: no tampons, douching, sexual intercourse, tub baths or pools.

## 2019-08-02 NOTE — DISCHARGE NOTE OB - MEDICATION SUMMARY - MEDICATIONS TO TAKE
I will START or STAY ON the medications listed below when I get home from the hospital:    acetaminophen 325 mg oral tablet  -- 3 tab(s) by mouth every 6 hours, As Needed  -- Indication: For pain    ibuprofen 600 mg oral tablet  -- 1 tab(s) by mouth every 6 hours, As Needed  -- Indication: For pain    benzocaine 20% topical spray  -- 1 spray(s) on skin every 6 hours, As needed, for Perineal discomfort  -- Indication: For perineal discomfort

## 2019-08-02 NOTE — DISCHARGE NOTE OB - CARE PROVIDER_API CALL
Lucille Forte)  OBSGYN  Physicians  [address] [place] [address]  Phone: [phone]  Fax: [phone]  Follow Up Time:

## 2019-08-02 NOTE — DISCHARGE NOTE OB - PLAN OF CARE
Healthy patient and baby No heavy lifting.   Nothing inside the vagina for 6 weeks: no tampons, douching, sexual intercourse, tub baths or pools.

## 2019-08-06 DIAGNOSIS — O48.0 POST-TERM PREGNANCY: ICD-10-CM

## 2019-08-06 DIAGNOSIS — Z98.1 ARTHRODESIS STATUS: ICD-10-CM

## 2019-08-06 DIAGNOSIS — Z3A.40 40 WEEKS GESTATION OF PREGNANCY: ICD-10-CM

## 2019-09-19 ENCOUNTER — APPOINTMENT (OUTPATIENT)
Dept: OBGYN | Facility: CLINIC | Age: 34
End: 2019-09-19

## 2019-09-24 ENCOUNTER — APPOINTMENT (OUTPATIENT)
Dept: OBGYN | Facility: CLINIC | Age: 34
End: 2019-09-24
Payer: MEDICAID

## 2019-09-24 PROCEDURE — 87490 CHLMYD TRACH DNA DIR PROBE: CPT

## 2020-01-01 ENCOUNTER — EMERGENCY (EMERGENCY)
Facility: HOSPITAL | Age: 35
LOS: 0 days | Discharge: HOME | End: 2020-01-01
Admitting: EMERGENCY MEDICINE
Payer: MEDICAID

## 2020-01-01 VITALS
HEART RATE: 98 BPM | OXYGEN SATURATION: 99 % | SYSTOLIC BLOOD PRESSURE: 130 MMHG | TEMPERATURE: 99 F | DIASTOLIC BLOOD PRESSURE: 91 MMHG | RESPIRATION RATE: 17 BRPM

## 2020-01-01 DIAGNOSIS — M41.9 SCOLIOSIS, UNSPECIFIED: Chronic | ICD-10-CM

## 2020-01-01 DIAGNOSIS — R50.9 FEVER, UNSPECIFIED: ICD-10-CM

## 2020-01-01 DIAGNOSIS — J11.1 INFLUENZA DUE TO UNIDENTIFIED INFLUENZA VIRUS WITH OTHER RESPIRATORY MANIFESTATIONS: ICD-10-CM

## 2020-01-01 PROCEDURE — 99283 EMERGENCY DEPT VISIT LOW MDM: CPT

## 2020-01-01 RX ORDER — IBUPROFEN 200 MG
600 TABLET ORAL ONCE
Refills: 0 | Status: COMPLETED | OUTPATIENT
Start: 2020-01-01 | End: 2020-01-01

## 2020-01-01 RX ADMIN — Medication 600 MILLIGRAM(S): at 15:35

## 2020-01-01 NOTE — ED PROVIDER NOTE - NSFOLLOWUPINSTRUCTIONS_ED_ALL_ED_FT
You have been diagnosed with influenza or the flu.Flu is a contagious respiratory illness caused by influenza viruses that infect the nose, throat, and sometimes the lungs.It can cause mild to severe illness, and at times can lead to death. The best way to prevent flu is by getting a flu vaccine each year.Flu can cause mild to severe illness, and at times can lead to death.Flu is different from a cold.Flu usually comes on suddenly. People who have flu often feel some or all of these symptoms:fever,cough,sorethroat,runny or stuffy nose,body aches,headache,chills,fatigue, and sometimes diarrhea and vomiting.Most experts believe that flu viruses spread mainly by tiny droplets made when people with flu cough, sneeze or talk.These droplets can land in the mouths or noses of people who are nearby.Less often, a person might get flu by touching a surface or object that has flu virus on it and then touching their own mouth, nose or possibly their eyes, People with flu are most contagious in the first 3-4 days after their illness begins.Some otherwise healthy adults may be able to infect others beginning 1 day before symptoms develop and up to 5 to 7 days afterbecoming sick.Some people,especially young children and people with weakened immune systems,might be able to infect others with flu viruses for an even longer time.

## 2020-01-01 NOTE — ED PROVIDER NOTE - OBJECTIVE STATEMENT
34 year old female with no pmhx presents with fever x 1 day. pt admits to body aches, sore throat, ear pain, nausea, and vomiting. pts daughter is 5 months old and diagnosed with influenza yesterday and given tamiflu. no cp, sob, abdominal pain, diarrhea, or recent travel.

## 2020-01-01 NOTE — ED PROVIDER NOTE - PHYSICAL EXAMINATION
CONST: Well appearing in NAD  EYES: PERRL, EOMI, Sclera and conjunctiva clear.   ENT: + nasal discharge. TM's clear. Oropharynx erythematous, no exudates. No abscess or swelling. Uvula midline. No temporal artery or mastoid tenderness.  NECK: Non-tender, no meningeal signs. normal ROM. supple   CARD: Normal S1 S2; Normal rate and rhythm  RESP: Equal BS B/L, No wheezes, rhonchi or rales. No distress  GI: Soft, non-tender, non-distended. no cva tenderness. normal BS  MS: Normal ROM in all extremities. No midline spinal tenderness. pulses 2 +. no calf tenderness or swelling  SKIN: Warm, dry, no acute rashes. Good turgor

## 2020-02-25 ENCOUNTER — APPOINTMENT (OUTPATIENT)
Dept: OBGYN | Facility: CLINIC | Age: 35
End: 2020-02-25
Payer: MEDICAID

## 2020-02-25 PROCEDURE — 99213 OFFICE O/P EST LOW 20 MIN: CPT | Mod: 25

## 2020-02-25 PROCEDURE — 76817 TRANSVAGINAL US OBSTETRIC: CPT

## 2020-05-17 ENCOUNTER — EMERGENCY (EMERGENCY)
Facility: HOSPITAL | Age: 35
LOS: 0 days | Discharge: HOME | End: 2020-05-17
Attending: EMERGENCY MEDICINE | Admitting: EMERGENCY MEDICINE
Payer: MEDICAID

## 2020-05-17 VITALS
DIASTOLIC BLOOD PRESSURE: 73 MMHG | OXYGEN SATURATION: 99 % | TEMPERATURE: 99 F | RESPIRATION RATE: 18 BRPM | SYSTOLIC BLOOD PRESSURE: 144 MMHG | HEART RATE: 82 BPM
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
## 2020-05-17 DIAGNOSIS — R07.81 PLEURODYNIA: ICD-10-CM

## 2020-05-17 DIAGNOSIS — Z79.899 OTHER LONG TERM (CURRENT) DRUG THERAPY: ICD-10-CM
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
## 2020-05-17 DIAGNOSIS — Z79.2 LONG TERM (CURRENT) USE OF ANTIBIOTICS: ICD-10-CM
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
## 2020-05-17 DIAGNOSIS — M41.9 SCOLIOSIS, UNSPECIFIED: Chronic | ICD-10-CM

## 2020-05-17 PROCEDURE — 71101 X-RAY EXAM UNILAT RIBS/CHEST: CPT | Mod: 26,LT

## 2020-05-17 PROCEDURE — 99284 EMERGENCY DEPT VISIT MOD MDM: CPT

## 2020-05-17 RX ORDER — KETOROLAC TROMETHAMINE 30 MG/ML
30 SYRINGE (ML) INJECTION ONCE
Refills: 0 | Status: DISCONTINUED | OUTPATIENT
Start: 2020-05-17 | End: 2020-05-17

## 2020-05-17 RX ADMIN — Medication 30 MILLIGRAM(S): at 16:24

## 2020-05-17 NOTE — ED PROVIDER NOTE - PHYSICAL EXAMINATION
CONST: Well appearing in NAD  NECK: Non-tender, normal ROM.   CARD: Normal S1 S2; Normal rate and rhythm  RESP: Equal BS B/L, No wheezes, rhonchi or rales. No distress  GI: Soft, non-tender, non-distended. no cva tenderness. normal BS  MS: + tenderness to left lateral ribs, 6-8. Normal ROM in all extremities. No midline spinal tenderness. pulses 2 +. no calf tenderness or swelling  SKIN: Warm, dry, no acute rashes. Good turgor. no ecchymosis   NEURO: A&Ox3, No focal deficits. Strength 5/5 with no sensory deficits. Steady gait. Finger to nose intact. Negative pronator drift

## 2020-05-17 NOTE — ED PROVIDER NOTE - PRO INTERPRETER NEED 2
English Complex Repair And Rotation Flap Text: The defect edges were debeveled with a #15 scalpel blade.  The primary defect was closed partially with a complex linear closure.  Given the location of the remaining defect, shape of the defect and the proximity to free margins a rotation flap was deemed most appropriate for complete closure of the defect.  Using a sterile surgical marker, an appropriate advancement flap was drawn incorporating the defect and placing the expected incisions within the relaxed skin tension lines where possible.    The area thus outlined was incised deep to adipose tissue with a #15 scalpel blade.  The skin margins were undermined to an appropriate distance in all directions utilizing iris scissors.

## 2020-05-17 NOTE — ED PROVIDER NOTE - NS ED ROS FT
Review of Systems:  	•	CONSTITUTIONAL - no fever, no diaphoresis, no chills  	•	SKIN - no rash  	•	HEMATOLOGIC - no bleeding, no bruising  	•	EYES - no eye pain, no blurry vision  	•	ENT - no change in hearing, no sore throat, no ear pain or tinnitus  	•	RESPIRATORY - no shortness of breath, no cough  	•	CARDIAC - no chest pain, no palpitations  	•	GI - no abd pain, no nausea, no vomiting, no diarrhea, no constipation  	•	GENITO-URINARY - no discharge, no dysuria; no hematuria, no increased urinary frequency  	•	MUSCULOSKELETAL - + left rib pain  	•	NEUROLOGIC - no weakness, no headache, no paresthesias, no LOC

## 2020-05-17 NOTE — ED ADULT TRIAGE NOTE - CHIEF COMPLAINT QUOTE
Pt states she fell down steps x  4 days ago. Pt now c/o left sided rib pain. Pt denies blood thinners.

## 2020-05-17 NOTE — ED PROVIDER NOTE - ATTENDING CONTRIBUTION TO CARE
36 yo F presents to ED for L side pain. Patient slipped and fell down a flight off stairs 4 days ago. Since then she has been having pain. Patient did not hit her head. No LOC. No SOB, abdominal pain, CP, hematuria.     Const: Well nourished, well developed, appears stated age  Eyes: PERRL, no conjunctival injection  HENT:  Neck supple without meningismus   CV: RRR, Warm, well-perfused extremities  RESP: CTA B/L, no tachypnea   GI: soft, non-tender, non-distended  MSK: Tenderness to L ribs  Skin: Warm, dry. No rashes, No ecchymosis   Neuro: Alert, CNs II-XII grossly intact. Sensation and motor function of extremities grossly intact.  Psych: Appropriate mood and affect.    Will do xray to look for rib fracture or pneumo.  Abdomen SNTND and no hematuria making intraabdominal pathology unlikely
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
## 2020-05-17 NOTE — ED PROVIDER NOTE - PATIENT PORTAL LINK FT
You can access the FollowMyHealth Patient Portal offered by [facility] by registering at the following website: [email or web address]. By joining [employer]’s FollowMyHealth portal, you will also be able to view your health information using other applications (apps) compatible with our system.

## 2020-05-17 NOTE — ED PROVIDER NOTE - OBJECTIVE STATEMENT
35 year old female with pmhx of scoliosis, presents with left rib pain s/p fall x 4 days ago. pt admits slipped while going down stairwell, fell onto left ribs and tumbled down flight. pt denies head trauma or loc. pt complaining of left rib pain, worse with movement, took tylenol with minimal relief. no sob, chest pain, abd pain, nausea, vomiting, diarrhea, ha, or dizziness. no neck or back pain. no blood thinners.

## 2020-09-04 ENCOUNTER — EMERGENCY (EMERGENCY)
Facility: HOSPITAL | Age: 35
LOS: 0 days | Discharge: HOME | End: 2020-09-04
Attending: EMERGENCY MEDICINE | Admitting: EMERGENCY MEDICINE
Payer: MEDICAID

## 2020-09-04 VITALS
DIASTOLIC BLOOD PRESSURE: 97 MMHG | SYSTOLIC BLOOD PRESSURE: 171 MMHG | TEMPERATURE: 96 F | OXYGEN SATURATION: 100 % | HEART RATE: 75 BPM | HEIGHT: 65 IN | RESPIRATION RATE: 18 BRPM

## 2020-09-04 DIAGNOSIS — Z98.890 OTHER SPECIFIED POSTPROCEDURAL STATES: ICD-10-CM

## 2020-09-04 DIAGNOSIS — Z79.1 LONG TERM (CURRENT) USE OF NON-STEROIDAL ANTI-INFLAMMATORIES (NSAID): ICD-10-CM

## 2020-09-04 DIAGNOSIS — M41.9 SCOLIOSIS, UNSPECIFIED: Chronic | ICD-10-CM

## 2020-09-04 DIAGNOSIS — Z11.3 ENCOUNTER FOR SCREENING FOR INFECTIONS WITH A PREDOMINANTLY SEXUAL MODE OF TRANSMISSION: ICD-10-CM

## 2020-09-04 DIAGNOSIS — M41.9 SCOLIOSIS, UNSPECIFIED: ICD-10-CM

## 2020-09-04 DIAGNOSIS — Z79.899 OTHER LONG TERM (CURRENT) DRUG THERAPY: ICD-10-CM

## 2020-09-04 PROCEDURE — 99284 EMERGENCY DEPT VISIT MOD MDM: CPT

## 2020-09-04 RX ORDER — AZITHROMYCIN 500 MG/1
1000 TABLET, FILM COATED ORAL ONCE
Refills: 0 | Status: COMPLETED | OUTPATIENT
Start: 2020-09-04 | End: 2020-09-04

## 2020-09-04 RX ORDER — CEFTRIAXONE 500 MG/1
250 INJECTION, POWDER, FOR SOLUTION INTRAMUSCULAR; INTRAVENOUS ONCE
Refills: 0 | Status: COMPLETED | OUTPATIENT
Start: 2020-09-04 | End: 2020-09-04

## 2020-09-04 RX ADMIN — CEFTRIAXONE 250 MILLIGRAM(S): 500 INJECTION, POWDER, FOR SOLUTION INTRAMUSCULAR; INTRAVENOUS at 14:56

## 2020-09-04 RX ADMIN — AZITHROMYCIN 1000 MILLIGRAM(S): 500 TABLET, FILM COATED ORAL at 14:56

## 2020-09-04 NOTE — ED PROVIDER NOTE - PATIENT PORTAL LINK FT
You can access the FollowMyHealth Patient Portal offered by [facility] by registering at the following website: [email or web address]. By joining "[employer]’s FollowMyHealth portal, you will also be able to view your health information using other applications (apps) compatible with our system.
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
## 2020-09-04 NOTE — ED ADULT TRIAGE NOTE - HEIGHT IN INCHES
Patient was notified of results/recommendations.  Patient wishes to wait until her MRI results are completed after tomorrow to determine next steps.  No orders placed at this time, Dr. Tovar Port updated.   5
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
## 2020-09-04 NOTE — ED ADULT NURSE NOTE - NSIMPLEMENTINTERV_GEN_ALL_ED
Implemented All Universal Safety Interventions:  [place] to call system. Call bell, personal items and telephone within reach. Instruct patient to call for assistance. Room bathroom lighting operational. Non-slip footwear when patient is off stretcher. Physically safe environment: no spills, clutter or unnecessary equipment. Stretcher in lowest position, wheels locked, appropriate side rails in place.

## 2020-09-04 NOTE — ED PROVIDER NOTE - NSFOLLOWUPINSTRUCTIONS_ED_ALL_ED_FT
Follow up with your primary medical doctor in 1-2 days    Sexually Transmitted Disease    A sexually transmitted disease (STD) is a disease or infection that may be passed (transmitted) from person to person, usually during sexual activity. This may happen by way of saliva, semen, blood, vaginal mucus, or urine. Symptoms vary depending on the type of STD acquired and may include pain in the groin, discharge, and lesions or a rash. If you are started on an antibiotic take it exactly as instructed. Avoid sexual contact of any kind until cleared by a health care professional. Contact your sexual partner(s) to inform them of your diagnosis so that they may be tested as well.    SEEK IMMEDIATE MEDICAL CARE IF YOU HAVE THE FOLLOWING SYMPTOMS: severe abdominal pain, high fever, nausea/vomiting, or weight loss.

## 2020-09-04 NOTE — ED PROVIDER NOTE - NS ED ROS FT
Constitutional: (-) fever  Eyes/ENT: (-) blurry vision, (-) epistaxis  Cardiovascular: (-) chest pain, (-) syncope  Respiratory: (-) cough, (-) shortness of breath  Gastrointestinal: (-) vomiting, (-) diarrhea  : (-) vaginal discharge (-) dysuria   Musculoskeletal: (-) neck pain, (-) back pain, (-) joint pain  Integumentary: (-) rash, (-) edema  Neurological: (-) headache, (-) altered mental status  Psychiatric: (-) hallucinations  Allergic/Immunologic: (-) pruritus

## 2020-09-07 LAB
C TRACH RRNA SPEC QL NAA+PROBE: SIGNIFICANT CHANGE UP
N GONORRHOEA RRNA SPEC QL NAA+PROBE: SIGNIFICANT CHANGE UP
SPECIMEN SOURCE: SIGNIFICANT CHANGE UP

## 2020-09-30 NOTE — OB RN TRIAGE NOTE - NS_DISPOSITION_OBGYN_ALL_OB
Called the ER at the [facility] and spoke to Dr. Rob with patient report.Also gave report to Charge Nurse Aurelia.   Admit to Labor and Delivery

## 2020-11-18 ENCOUNTER — APPOINTMENT (OUTPATIENT)
Dept: OBGYN | Facility: CLINIC | Age: 35
End: 2020-11-18

## 2021-03-14 ENCOUNTER — EMERGENCY (EMERGENCY)
Facility: HOSPITAL | Age: 36
LOS: 0 days | Discharge: HOME | End: 2021-03-14
Attending: EMERGENCY MEDICINE
Payer: MEDICAID

## 2021-03-14 VITALS
HEIGHT: 65 IN | TEMPERATURE: 98 F | RESPIRATION RATE: 18 BRPM | HEART RATE: 75 BPM | WEIGHT: 119.93 LBS | OXYGEN SATURATION: 100 % | DIASTOLIC BLOOD PRESSURE: 91 MMHG | SYSTOLIC BLOOD PRESSURE: 152 MMHG

## 2021-03-14 DIAGNOSIS — M41.9 SCOLIOSIS, UNSPECIFIED: Chronic | ICD-10-CM

## 2021-03-14 PROCEDURE — 70486 CT MAXILLOFACIAL W/O DYE: CPT | Mod: 26

## 2021-03-14 PROCEDURE — 71046 X-RAY EXAM CHEST 2 VIEWS: CPT | Mod: 26

## 2021-03-14 PROCEDURE — 70450 CT HEAD/BRAIN W/O DYE: CPT | Mod: 26

## 2021-03-14 PROCEDURE — 99285 EMERGENCY DEPT VISIT HI MDM: CPT

## 2021-03-14 PROCEDURE — 72125 CT NECK SPINE W/O DYE: CPT | Mod: 26

## 2021-03-14 RX ORDER — METHOCARBAMOL 500 MG/1
1000 TABLET, FILM COATED ORAL ONCE
Refills: 0 | Status: COMPLETED | OUTPATIENT
Start: 2021-03-14 | End: 2021-03-14

## 2021-03-14 RX ORDER — IBUPROFEN 200 MG
1 TABLET ORAL
Qty: 21 | Refills: 0
Start: 2021-03-14 | End: 2021-03-20

## 2021-03-14 RX ORDER — ACETAMINOPHEN 500 MG
975 TABLET ORAL ONCE
Refills: 0 | Status: COMPLETED | OUTPATIENT
Start: 2021-03-14 | End: 2021-03-14

## 2021-03-14 RX ORDER — METHOCARBAMOL 500 MG/1
1 TABLET, FILM COATED ORAL
Qty: 21 | Refills: 0
Start: 2021-03-14 | End: 2021-03-20

## 2021-03-14 RX ADMIN — METHOCARBAMOL 1000 MILLIGRAM(S): 500 TABLET, FILM COATED ORAL at 12:19

## 2021-03-14 RX ADMIN — Medication 975 MILLIGRAM(S): at 12:19
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
## 2021-03-14 NOTE — ED ADULT NURSE NOTE - NSIMPLEMENTINTERV_GEN_ALL_ED
Implemented All Fall Risk Interventions:  [place] to call system. Call bell, personal items and telephone within reach. Instruct patient to call for assistance. Room bathroom lighting operational. Non-slip footwear when patient is off stretcher. Physically safe environment: no spills, clutter or unnecessary equipment. Stretcher in lowest position, wheels locked, appropriate side rails in place. Provide visual cue, wrist band, yellow gown, etc. Monitor gait and stability. Monitor for mental status changes and reorient to person, place, and time. Review medications for side effects contributing to fall risk. Reinforce activity limits and safety measures with patient and family.

## 2021-03-14 NOTE — ED PROVIDER NOTE - NSFOLLOWUPINSTRUCTIONS_ED_ALL_ED_FT
Abrasion  ImageAn abrasion is a cut or a scrape on the outer surface of the skin. An abrasion does not go through all the layers of the skin. It is important to care for your abrasion properly to prevent infection.    What are the causes?  This condition is caused by falling on or gliding across the ground or another surface. When your skin rubs on something, the outer and inner layers of skin may rub off.    What are the signs or symptoms?  The main symptom of this condition is a cut or a scrape. The scrape may be bleeding, or it may appear red or pink. If the abrasion was caused by a fall, there may be a bruise under the cut or scrape.    How is this diagnosed?  An abrasion is diagnosed with a physical exam.    How is this treated?  Treatment for this condition depends on how large and deep the abrasion is. In most cases:    Your abrasion will be cleaned with water and mild soap. This is done to remove any dirt or debris (such as particles of glass or rock) that may be stuck in the wound.  An antibiotic ointment may be applied to the abrasion to help prevent infection.  A bandage (dressing) may be placed on the abrasion to keep it clean.    You may also need a tetanus shot.    Follow these instructions at home:  Medicines     Take or apply over-the-counter and prescription medicines only as told by your health care provider.  If you were prescribed an antibiotic medicine, apply it as told by your health care provider.  Wound care     Clean the wound 2–3 times a day, or as directed by your health care provider. To do this, wash the wound with mild soap and water, rinse off the soap, and pat the wound dry with a clean towel. Do not rub the wound.  Keep the dressing clean and dry as told by your health care provider.  There are many different ways to close and cover a wound. Follow instructions from your health care provider about:    Caring for your wound.  Changing and removing your dressing. You may have to change your dressing one or more times a day, or as directed by your health care provider.    Check your wound every day for signs of infection. Check for:    Redness, particularly a red streak that spreads out from the wound.  Swelling or increased pain.  Warmth.  Fluid, pus, or a bad smell.    If directed, put ice on the injured area to reduce pain and swelling:    Put ice in a plastic bag.   Place a towel between your skin and the bag.   Leave the ice on for 20 minutes, 2–3 times a day.    General instructions     Do not take baths, swim, or use a hot tub until your health care provider says it is okay to do so.  If possible, raise (elevate) the injured area above the level of your heart while you are sitting or lying down. This will reduce pain and swelling.  Keep all follow-up visits as directed by your health care provider. This is important.  Contact a health care provider if:  You received a tetanus shot, and you have swelling, severe pain, redness, or bleeding at the injection site.  Your pain is not controlled with medicine.  You have redness, swelling, or more pain at the site of your wound.  Get help right away if:  You have a red streak spreading away from your wound.  You have a fever.  You have fluid, blood, or pus coming from your wound.  You notice a bad smell coming from your wound or your dressing.  Summary  An abrasion is a cut or a scrape on the outer surface of the skin. An abrasion does not go through all the layers of the skin.  Care for your abrasion properly to prevent infection.  Clean the wound with mild soap and water 2–3 times a day. Follow instructions from your health care provider about taking medicines and changing your bandage (dressing).  Contact your health care provider if you have redness, swelling or more pain in the wound area.  Get help right away if you have a fever or if you have fluid, blood, pus, a bad smell, or a red streak coming from the wound.  This information is not intended to replace advice given to you by your health care provider. Make sure you discuss any questions you have with your health care provider.    Closed Head Injury    A closed head injury is an injury to your head that may or may not involve a traumatic brain injury (TBI). Symptoms of TBI can be short or long lasting and include headache, dizziness, interference with memory or speech, fatigue, confusion, changes in sleep, mood changes, nausea, depression/anxiety, and dulling of senses. Make sure to obtain proper rest which includes getting plenty of sleep, avoiding excessive visual stimulation, and avoiding activities that may cause physical or mental stress. Avoid any situation where there is potential for another head injury, including sports.    SEEK IMMEDIATE MEDICAL CARE IF YOU HAVE ANY OF THE FOLLOWING SYMPTOMS: unusual drowsiness, vomiting, severe dizziness, seizures, lightheadedness, muscular weakness, different pupil sizes, visual changes, or clear or bloody discharge from your ears or nose.
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
## 2021-03-14 NOTE — ED PROVIDER NOTE - PATIENT PORTAL LINK FT
You can access the FollowMyHealth Patient Portal offered by [facility] by registering at the following website: [email or web address]. By joining [employer]’s FollowMyHealth portal, you will also be able to view your health information using other applications (apps) compatible with our system.
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
## 2021-03-18 DIAGNOSIS — S16.1XXA STRAIN OF MUSCLE, FASCIA AND TENDON AT NECK LEVEL, INITIAL ENCOUNTER: ICD-10-CM

## 2021-03-18 DIAGNOSIS — S09.90XA UNSPECIFIED INJURY OF HEAD, INITIAL ENCOUNTER: ICD-10-CM

## 2021-03-18 DIAGNOSIS — Z79.899 OTHER LONG TERM (CURRENT) DRUG THERAPY: ICD-10-CM
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
## 2021-03-18 DIAGNOSIS — F17.200 NICOTINE DEPENDENCE, UNSPECIFIED, UNCOMPLICATED: ICD-10-CM

## 2021-03-18 DIAGNOSIS — S39.012A STRAIN OF MUSCLE, FASCIA AND TENDON OF LOWER BACK, INITIAL ENCOUNTER: ICD-10-CM
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
## 2021-03-18 DIAGNOSIS — S00.81XA ABRASION OF OTHER PART OF HEAD, INITIAL ENCOUNTER: ICD-10-CM

## 2021-03-18 DIAGNOSIS — R51.9 HEADACHE, UNSPECIFIED: ICD-10-CM

## 2021-05-30 NOTE — OB RN PATIENT PROFILE - 'COMMUNITY AGENCIES/SUPPORT GROUPS, OB PROFILE
See 6/18/19 messages.    Patient overdue for diabetic eye exam.  Has has vision changes since stroke. Recent admission to ED.  Next PCP visit 10/23/19.    Left voice message requesting patient to call re: eye exam.  Included clinic phone number and date of call.    When call is returned, please obtain information re: recent or scheduled eye exam.  Copy to be sent to chart when available.               
N/A
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
## 2021-10-12 ENCOUNTER — OUTPATIENT (OUTPATIENT)
Dept: OUTPATIENT SERVICES | Facility: HOSPITAL | Age: 36
LOS: 1 days | Discharge: HOME | End: 2021-10-12
Payer: MEDICAID

## 2021-10-12 ENCOUNTER — RESULT REVIEW (OUTPATIENT)
Age: 36
End: 2021-10-12

## 2021-10-12 DIAGNOSIS — M41.125 ADOLESCENT IDIOPATHIC SCOLIOSIS, THORACOLUMBAR REGION: ICD-10-CM

## 2021-10-12 DIAGNOSIS — M41.9 SCOLIOSIS, UNSPECIFIED: Chronic | ICD-10-CM

## 2021-10-12 PROCEDURE — 72082 X-RAY EXAM ENTIRE SPI 2/3 VW: CPT | Mod: 26

## 2021-10-20 ENCOUNTER — APPOINTMENT (OUTPATIENT)
Dept: PEDIATRIC ORTHOPEDIC SURGERY | Facility: CLINIC | Age: 36
End: 2021-10-20
Payer: MEDICAID

## 2021-10-20 VITALS — BODY MASS INDEX: 21.33 KG/M2 | WEIGHT: 128 LBS | HEIGHT: 65 IN

## 2021-10-20 PROCEDURE — 99204 OFFICE O/P NEW MOD 45 MIN: CPT

## 2021-10-20 NOTE — PHYSICAL EXAM
[Not Examined] : not examined [Normal] : The patient is moving all extremities spontaneously without any gross neurologic deficits. They walk with a fluid nonantalgic gait. There are equal and symmetric deep tendon reflexes in the upper and lower extremities bilaterally. There is gross intact sensation to soft and light touch in the bilateral upper and lower extremities

## 2021-10-20 NOTE — DATA REVIEWED
[de-identified] : Stable anterior fusion with no change from last 10 years\par I visually reviewed the images\par

## 2021-10-20 NOTE — ASSESSMENT
[FreeTextEntry1] : I discussed her sources of pain. \par I suggested we start with a course of PT\par If her pain isn't better we'll work her up for other causes of pain

## 2021-10-20 NOTE — HISTORY OF PRESENT ILLNESS
[Worsening] : worsening [Bending] : worsened by bending [Direct Pressure] : worsened by direct pressure [Running] : worsened by running [Walking] : worsened by walking [None] : No relieving factors are noted [10] : currently ~his/her~ pain is 10 out of 10 [Constant] : ~He/She~ states the symptoms seem to be constant [FreeTextEntry1] :  STEFANIA  Is here today because she was diagnosed with scoliosis as a child at age 13. She had back surgery when she was 14 years old. STEFANIA states that the pain is daily and constant. She hasn’t seen a spine doctor since she was 20. She complains of stiffness, pain, numbness, weakness and loss of balance.\par \par Has used a brace for treatment:  No\par \par Menarche Date   14 years old      (This is relevant to scoliosis and treatment)\par \par They deny any history of pain and fever, any history of numbness or tingling. Any history of change in bladder or bowel function. No history of weakness and denies any history of bug or tick bites or rashes.\par \par No family history of scoliosis\par \par See below for past medical/surgical history\par

## 2022-04-28 ENCOUNTER — OUTPATIENT (OUTPATIENT)
Dept: OUTPATIENT SERVICES | Facility: HOSPITAL | Age: 37
LOS: 1 days | Discharge: HOME | End: 2022-04-28
Payer: MEDICAID

## 2022-04-28 ENCOUNTER — APPOINTMENT (OUTPATIENT)
Dept: PEDIATRIC ORTHOPEDIC SURGERY | Facility: CLINIC | Age: 37
End: 2022-04-28
Payer: MEDICAID

## 2022-04-28 VITALS — HEIGHT: 65 IN

## 2022-04-28 DIAGNOSIS — M41.125 ADOLESCENT IDIOPATHIC SCOLIOSIS, THORACOLUMBAR REGION: ICD-10-CM

## 2022-04-28 DIAGNOSIS — M41.9 SCOLIOSIS, UNSPECIFIED: Chronic | ICD-10-CM

## 2022-04-28 PROCEDURE — 99213 OFFICE O/P EST LOW 20 MIN: CPT

## 2022-04-28 PROCEDURE — 72082 X-RAY EXAM ENTIRE SPI 2/3 VW: CPT | Mod: 26

## 2022-04-28 NOTE — PHYSICAL EXAM
[Normal] : Gait: normal [Brudzinski Sign] : negative Brudzinski Sign [Shepard's Sign] : negative Shepard's sign [Pronator Drift] : negative pronator drift [SLR] : negative straight leg raise [UE/LE] : Sensory: Intact in bilateral upper & lower extremities [ALL] : Biceps, brachioradialis, triceps, patellar, ankle and plantar 2+ and symmetric bilaterally [de-identified] : Stable hardware with residual scoliosis\par Pain is not at the site of residual scoliosis\par

## 2022-04-28 NOTE — HISTORY OF PRESENT ILLNESS
[Pain Location] : pain [C-Spine] : C-spine region [T-Spine] : T-spine region [Lumbar] : lumbar region [Stable] : stable [___ yrs] : [unfilled] year(s) ago [5] : a current pain level of 5/10 [8] : an average pain level of 8/10 [10] : a minimum pain level of 10/10 [4] : a maximum pain level of 4/10 [de-identified] : Abby is here for follow up of her back pain \par She had her surgery done in as a child and has been stable\par Recently she has been having increasing ammount of pain in the neck and back\par Failed 3 Months of PT

## 2022-04-28 NOTE — REASON FOR VISIT
[Follow-Up Visit] : a follow-up visit for [Back Pain] : back pain [Scoliosis] : scoliosis [FreeTextEntry2] : scoliosis

## 2022-04-28 NOTE — ASSESSMENT
[FreeTextEntry1] : We had A LONG DICUSSION ABOUT HER BACK AND POSSIBLE REVISION \par wE WILL START WITH AN mri OF THE neCK AND t SPINE AND SEE IF THERE IS ANY CAUSE FOR PAIN \par

## 2022-05-02 ENCOUNTER — APPOINTMENT (OUTPATIENT)
Dept: OBGYN | Facility: CLINIC | Age: 37
End: 2022-05-02
Payer: MEDICAID

## 2022-05-02 PROCEDURE — 99395 PREV VISIT EST AGE 18-39: CPT

## 2022-05-02 PROCEDURE — 87490 CHLMYD TRACH DNA DIR PROBE: CPT

## 2022-05-31 NOTE — PROGRESS NOTE ADULT - REASON FOR ADMISSION
Occupational Therapy Treatment Note   Date: 5/31/2022  Name: Adryan Garcia  Clinic Number: [id number]  Age: 7 y.o. 1 m.o.    Therapy Diagnosis:   Encounter Diagnoses   Name Primary?    Upper extremity weakness Yes    Sensory processing difficulty     Fine motor delay      Physician: Maryanne Pinzon MD    Physician Orders: Evaluate and Treat  Medical Diagnosis: Upper extremity weakness [R29.898], Fine motor delay [F82], Sensory processing difficulty [F88]  Evaluation Date: 9/3/2020   Insurance Authorization Period Expiration: 12/31/2022  Plan of Care Certification Period: 04/26/2022 - 10/26/2022    Visit # / Visits authorized: 7 / 20  Time In: 3:18 PM   Time Out: 4:00 PM  Total Billable Time:  30  minutes    Precautions:  Standard  Subjective   MotherCammy brought Adryan to therapy today. Adryan arrived with glasses, no hearing aids.     Patient/caregiver reports: Adryan has been asking for adults' snacks in home environment. She has also been licking crackers.     Response to previous treatment: Improved engagement in therapeutic tasks.   Pain: Adryan is unable to rate pain on numeric scale.   FLACC Pain Scale: Patient scored 0/10 on the FLACC scale for assessment of non-verbal signs of Pain using the following criteria:     Criteria Score: 0 Score: 1 Score: 2   Face No particular expression or smile Occasional grimace or frown, withdrawn, uninterested Frequent to constant quivering chin, clenched jaw   Legs Normal position or relaxed Uneasy, restless, tense Kicking, or legs drawn up   Activity Lying quietly, normal position moves easily Squirming, shifting, back and forth, tense Arched, rigid, or jerking   Cry No cry (awake or asleep) Moans or whimpers; occasional complaint Crying steadily, screams or sobs, frequent complaints   Consolability Content, relaxed Reassured by occasional touching, hugging or being talked to, disractible Difficult to console or comfort      [Nirav DIAZ, Inderjit MENDOZA, Carlos SOTO.  Pain assessment in infants and young children: the FLACC scale. Am J Nurse. 2002;[id number])[id number].]        Objective     Adryan participated in dynamic functional therapeutic activities to improve functional performance for 30 minutes, including:     · [place] with speech therapy  · Poor regulation upon presentation to session, characterized by crying, pointing at the door, and trying to leave therapy space.   · Tactile play with water and food-based play (goldfish, play cookies), readily touching textures, feeding therapist, bringing goldfish to mouth x multiple attempts  · [place] AFOs and SMOs, minimal assistance  · Donning shoes, Maximal assistance  · Donning socks, moderate assistance    Formal Testing:   Completed 4/26/2022  The Roll Evaluation of Activities of Life (The REAL) is a standardized rating scale that assesses a child's ability to care for themselves at home, at school, and in the community. It includes activities of daily living (ADLs) as well as instrumental activities of daily living (IADLs) for children ages 2 years old to 18 years 11 months old. The REAL standard scores are based on a mean of 100 and standard deviation of 10.    Domain Raw Score Standard Score Percentile   ADLs 42 <76.7 <1   IADLs 9 <86.0 <1           Home Exercises and Education Provided     Education provided:   - Caregiver educated on current performance and POC. Caregiver verbalized understanding.  -Caregiver educated on strategies to promote independence in toothbrushing occupations. Caregiver verbalized understanding.   - Caregiver educated on strategies to promote engagement with wet/messy textures. Caregiver verbalized understanding.     Written Home Exercises Provided: Patient instructed to cont prior HEP.  Exercises were reviewed and Adryan was able to demonstrate them prior to the end of the session.  Adryan demonstrated good  understanding of the HEP provided.   .   See EMR under Patient Instructions for exercises provided  prior visit.        Assessment   Adryan was seen for occupational therapy follow-up session. Adryan with good tolerance to session. Adryan with improved regulation today, demonstrating improved self-care skills and tactile processing. Adryan with less averse behaviors with flavor and texture in oral region. Hyperresponsive tactile system impacts ability to engage in feeding and grooming tasks. Deficits continue to result in occupational performance dysfunction across natural environments. Medical management of club foot impacts mobility and participation in meaningful child occupations.Adryan would continue to benefit from skilled occupational therapy services.    Adryan is progressing well towards her goals and there are no updates to goals at this time. Pt prognosis is Fair.     Pt will continue to benefit from skilled outpatient occupational therapy to address the deficits listed in the problem list on initial evaluation provide pt/family education and to maximize pt's level of independence in the home and community environment.     Anticipated barriers to occupational therapy: attendance.    Pt's spiritual, cultural and educational needs considered and pt agreeable to plan of care and goals.    Goals:  Short term goals:   1. Demonstrate improved self-care skills by donning socks with minimal assistance on 2/3 trials within 3 months. (Extended 4/26/2022, progressing, not met)  2. Demonstrate improved visual-motor integration skills by forming prewriting strokes from a model (Vertical lines, horizontal lines, circles, crosses) independently on 2/3 trials within 3 months. (Extended 4/26/2022)  3. Demonstrate reduced tactile defensiveness by exploring 3 novel textures with minimal upset within 3 months. (Extended 4/26/2022)  4. Demonstrate improved self-care skills by engaging in simulated toothbrushing task without upset on 2/3 trials within 3 months. (Initiated 4/26/2022)    Long term goals:   1. Demonstrate  labor at term understanding of and report ongoing adherence to home exercise program. (Initiated 09/03/2020, ONGOING THROUGH DISCHARGE)  2. Demonstrate improved self-care skills by donning a shirt independently on 2/3 trials within 6 months. (Extended 4/26/2022)  Demonstrate reduced tactile defensiveness by exploring 3 novel textures without upset on within 6 months. (Extended 4/26/2022)  3. Demonstrate improved self-care skills by engaging in toothbrushing task without minimal upset on 2/3 trials within 3 months. (Initiated 4/26/2022)       Plan   Certification Period/Plan of care expiration:04/26/2022 - 10/26/2022      Outpatient Occupational Therapy 1 times weekly for 6 months to include the following interventions: Therapeutic activities, Therapeutic exercise, Patient/caregiver education, Home exercise program, ADL training and Sensory integration.      MARLYS Muñoz   5/31/2022

## 2022-08-23 NOTE — OB PROVIDER TRIAGE NOTE - NS_VISITREASON1_OBGYN_ALL_OB
Bill For Surgical Tray: no You can access the FollowMyHealth Patient Portal offered by [facility] by registering at the following website: [email or web address]. By joining [employer]’s FollowMyHealth portal, you will also be able to view your health information using other applications (apps) compatible with our system. Other

## 2022-08-29 ENCOUNTER — EMERGENCY (EMERGENCY)
Facility: HOSPITAL | Age: 37
LOS: 0 days | Discharge: HOME | End: 2022-08-29
Attending: STUDENT IN AN ORGANIZED HEALTH CARE EDUCATION/TRAINING PROGRAM

## 2022-08-29 VITALS
WEIGHT: 130.07 LBS | RESPIRATION RATE: 18 BRPM | HEART RATE: 93 BPM | SYSTOLIC BLOOD PRESSURE: 159 MMHG | HEIGHT: 65 IN | TEMPERATURE: 99 F | DIASTOLIC BLOOD PRESSURE: 109 MMHG | OXYGEN SATURATION: 99 %

## 2022-08-29 VITALS
HEART RATE: 84 BPM | OXYGEN SATURATION: 99 % | SYSTOLIC BLOOD PRESSURE: 142 MMHG | DIASTOLIC BLOOD PRESSURE: 88 MMHG | RESPIRATION RATE: 18 BRPM

## 2022-08-29 DIAGNOSIS — M41.9 SCOLIOSIS, UNSPECIFIED: Chronic | ICD-10-CM

## 2022-08-29 PROCEDURE — 99284 EMERGENCY DEPT VISIT MOD MDM: CPT

## 2022-08-29 RX ORDER — KETOROLAC TROMETHAMINE 30 MG/ML
30 SYRINGE (ML) INJECTION ONCE
Refills: 0 | Status: DISCONTINUED | OUTPATIENT
Start: 2022-08-29 | End: 2022-08-29

## 2022-08-29 RX ADMIN — Medication 30 MILLIGRAM(S): at 15:31

## 2022-08-29 NOTE — CONSULT NOTE ADULT - SUBJECTIVE AND OBJECTIVE BOX
Patient is a 37y old  Female who presents with a chief complaint of  facial swelling.     HPI: Patient reports she woke up in the morning with facial swelling on the left side. Patient states she has no primary dentist and does not like going to the dentist as she fears dental treatment and she would prefer to be put to sleep for dental treatment. Patient reports facial trauma on maxillary front teeth.       PAST MEDICAL & SURGICAL HISTORY:  Scoliosis      Lumbar scoliosis  Yassine placement as child        ( -  ) heart valve replacement  ( -  ) joint replacement  ( -  ) pregnancy    MEDICATIONS  (STANDING): denied    MEDICATIONS  (PRN): Motrin OTC      Allergies    No Known Allergies    Intolerances        FAMILY HISTORY:  No pertinent family history in first degree relatives      Vital Signs Last 24 Hrs  T(C): 37.3 (29 Aug 2022 14:45), Max: 37.3 (29 Aug 2022 14:45)  T(F): 99.1 (29 Aug 2022 14:45), Max: 99.1 (29 Aug 2022 14:45)  HR: 84 (29 Aug 2022 15:36) (84 - 93)  BP: 142/88 (29 Aug 2022 15:36) (142/88 - 159/109)  BP(mean): --  RR: 18 (29 Aug 2022 15:36) (18 - 18)  SpO2: 99% (29 Aug 2022 15:36) (99% - 99%)    Parameters below as of 29 Aug 2022 15:36  Patient On (Oxygen Delivery Method): room air        Last Dental Visit: << patient has no primary dentist  >>    EOE:  TMJ ( -  ) clicks                     (  - ) pops                     ( -  ) crepitus             Mandible <<FROM>>             Facial bones and MOM <<grossly intact>>             ( -  ) trismus             ( -  ) lymphadenopathy             ( +  ) swelling: lower left side of face, under left eye.              (  + ) asymmetry: lower left side of face, under left eye              (  + ) palpation             ( -  ) dyspnea             (  - ) dysphagia             (  - ) loss of consciousness    IOE:  <<permanent>> dentition                      hard/soft palate:  ( -  ) palatal torus, <<No pathology noted>>            tongue/FOM <<No pathology noted>>            labial/buccal mucosa <<No pathology noted>>           ( +  ) percussion: tooth #9           ( +  ) palpation: tooth #9            ( +  ) swelling: buccal vestibular swelling between #9 and 10           ( -  ) abscess           (  - ) sinus tract    *DENTAL RADIOGRAPHS: 3 periapical xrays taken showing periapical infection around #9 and 11, fractured #10 with no pulp exposure, root remnants #14 with periapical infection    *ASSESSMENT: Necrotic teeth # 9 and 11 with periapical infection, vestibular swelling on buccal of #9 symptomatic to palpation, grossly decayed root remnant #14 with periapical infection, fracture #10 without pulp exposure.     *PLAN: Patient was explained that teeth # 9, and 11, and 14 are infected and require treatment asap, and source of facial and vestibular swelling is tooth #9 at the moment. Patient was presented with treatment option of RCT, post and core, and crown for # 9 at private dental office as her insurance is not accepted at the dental clinic or extraction of #9 today at [facility]. Patient was informed that #14 will need to be extracted and tooth #11 will need RCT or extraction. Patient elects to not extract #9 today as she prefers treatment to be done under sedation. Patient instructed that incision and drainage needs to be performed today for tooth #9 to prevent infection progression which can be life threatening.     PROCEDURE:   Teeth # 9,10,11 were cold tested. Teeth # 9 and 11 are not responsive to cold test. Tooth #10 shows normal response to cold test.   Risks, benefits, and alternatives discussed with patient according to oral surgery sheet 07/13/00. Informed consent obtained for incision and drainage.   Administered 0.5 carpule carbocaine 3% via maxillary infiltrations around #9. Patient stopped treatment and did not allow administration of rest of anesthesia and stated '' I don't want to continue, I want to be put to sleep''. Patient was reexplained risk of not letting infection drain out including fast infection progression causing life threatening conditions. Patient understood and  insisted on refusing to proceed with treatment. Patient signed refusal of treatment form after all risks of not proceeding with treatment were explained. Prescribed amoxicillin 500mg q8hrs for 7 days and Ibuprofen 6000mg q6hrs for pain. Patient was given information for private oral surgeon in [place], patient advised to call and ask if they take her insurance and if they can see her as emergency asap. Patient advised to call insurance to get list of general dentists undernetwork for comprehensive dental care     RECOMMENDATIONS:  1) << Amoxicillin 500mg q8hrs for 7 days and Ibuprofen 6000mg q6hrs for pain.  >>  2) Dental F/U with outpatient dentist for comprehensive dental care.   3) If any difficulty swallowing/breathing, fever occur, return to ER.     Shelby Campos DMD, Pager #[phone].

## 2022-08-29 NOTE — ED ADULT NURSE NOTE - NSSEPSISSUSPECTED_ED_A_ED
4 Eyes Skin Assessment     The patient is being assess for  Admission    I agree that 2 RN's have performed a thorough Head to Toe Skin Assessment on the patient. ALL assessment sites listed below have been assessed. Areas assessed by both nurses: angelo/julissa  [x]   Head, Face, and Ears   [x]   Shoulders, Back, and Chest  [x]   Arms, Elbows, and Hands   [x]   Coccyx, Sacrum, and IschIum  [x]   Legs, Feet, and Heels        Does the Patient have Skin Breakdown?   Yes LDA WOUND CARE was Initiated documentation include the Ginny-wound, Wound Assessment, Measurements, Dressing Treatment, Drainage, and Color\", MASD, abrasions, scattered bruising        Nigel Prevention initiated:  Yes   Wound Care Orders initiated:  Yes      [id number] nurse consulted for Pressure Injury (Stage 3,4, Unstageable, DTI, NWPT, and Complex wounds), New and Established Ostomies:  NA      Nurse 1 eSignature: Electronically signed by Jadon Monson RN on 8/9/20 at 10:00 AM EDT    **SHARE this note so that the co-signing nurse is able to place an eSignature**    Nurse 2 eSignature: Electronically signed by Wen Santo RN on 8/9/20 at 6:43 PM EDT No

## 2022-08-29 NOTE — ED PROVIDER NOTE - OBJECTIVE STATEMENT
38 yo F pmhx scoliosis presenting to the ED for evaluation of L upper dental pain and L facial swelling since last night. Pt reports yesterday L upper dental pain and this morning woke up with worsening pain and facial swelling. pain worse with chewing. Denies fever, chills, nausea, vomiting, drooling, trismus, sob.

## 2022-08-29 NOTE — ED PROVIDER NOTE - NS ED ROS FT
Constitutional: No fever, chills.  Eyes:  No visual changes  ENMT:  (+)dentalgia  Cardiac:  No chest pain  Respiratory:  No cough, SOB  GI:  No nausea, vomiting, diarrhea, abdominal pain.  :  No dysuria, hematuria  MS:  No back pain.  Neuro:  No headache or lightheadedness  Skin:  No skin rash    Except as documented in the HPI,  all other systems are negative.

## 2022-08-29 NOTE — ED ADULT NURSE NOTE - OBJECTIVE STATEMENT
Pt presented to ED L sided dental pain x2 days. Denies n/v/d/fevers/chills. Pt A&Ox4, ambulatory. Airway patent.

## 2022-08-29 NOTE — ED PROVIDER NOTE - CARE PLAN
Principal Discharge DX:	Pain, dental   1 Principal Discharge DX:	Pain, dental  Assessment and plan of treatment:	plan- dental clinic

## 2022-08-29 NOTE — ED PROVIDER NOTE - PHYSICAL EXAMINATION
GENERAL: Well-nourished, Well-developed. NAD.  HEAD: No visible or palpable bumps or hematomas. No ecchymosis behind ears B/L.  Eyes: PERRLA, EOMI. No asymmetry. No nystagmus. No conjunctival injection. Non-icteric sclera.  ENMT: MMM. No pharyngeal erythema or exudates. Uvula midline. (+)tooth #16 broken, ttp. airway patent. handling secretions. no tongue elevation  Neck: Supple. FROM  CVS: Normal S1,S2. No murmurs appreciated on auscultation   RESP: Lungs clear to auscultation B/L. No wheezing, rales, or rhonchi auscultated.  Skin: Warm, Dry. No rashes or lesions. Good cap refill < 2 sec B/L.

## 2022-08-29 NOTE — ED PROVIDER NOTE - ATTENDING APP SHARED VISIT CONTRIBUTION OF CARE
37-year-old female past medical history scoliosis presents to the emergency department with left upper tooth pain for 2 days.  Patient reporting left facial swelling today prompting ED visit.  Patient denies fevers, difficulty eating or drinking.    No fever, nausea, vomiting, chest pain, sob, palpitations, diaphoresis, cough, headache, dizziness, abdominal pain, diarrhea, constipation.    CONSTITUTIONAL: NAD.   SKIN: warm, dry  HEAD: Normocephalic; atraumatic. L facial swelling, no erythema.   EYES: no conjunctival injection. EOMI. +ttp over tooth 15 with erythema.   ENT: MMM.   NECK: Supple.  CARD: RRR.   RESP: No accessory muscle use.   ABD: soft ntnd.   EXT: Normal ROM.  No lower extremity edema.   NEURO: Alert, oriented, grossly unremarkable.  PSYCH: Cooperative, appropriate.

## 2022-08-29 NOTE — ED PROVIDER NOTE - CLINICAL SUMMARY MEDICAL DECISION MAKING FREE TEXT BOX
37-year-old female presenting with dental pain.  Patient not in respiratory distress, tolerating secretions.  Patient referred to dental clinic at this time for further evaluation and care.

## 2022-08-31 DIAGNOSIS — R22.0 LOCALIZED SWELLING, MASS AND LUMP, HEAD: ICD-10-CM

## 2022-08-31 DIAGNOSIS — K08.89 OTHER SPECIFIED DISORDERS OF TEETH AND SUPPORTING STRUCTURES: ICD-10-CM

## 2022-08-31 DIAGNOSIS — Z87.39 PERSONAL HISTORY OF OTHER DISEASES OF THE MUSCULOSKELETAL SYSTEM AND CONNECTIVE TISSUE: ICD-10-CM

## 2022-08-31 DIAGNOSIS — K02.9 DENTAL CARIES, UNSPECIFIED: ICD-10-CM

## 2022-08-31 DIAGNOSIS — K04.7 PERIAPICAL ABSCESS WITHOUT SINUS: ICD-10-CM

## 2022-11-05 NOTE — OB PROVIDER H&P - NS_FHRDECEL_OBGYN_ALL_OB
Results from last 7 days   Lab Units 11/05/22  0504 11/04/22  1225 11/03/22  1733 10/29/22  1555   POTASSIUM mmol/L 4 3 3 6 2 9* 3 6     · Secondary to diuretic use  Repleted     · Resolved and stable No Decelerations

## 2023-01-24 ENCOUNTER — EMERGENCY (EMERGENCY)
Facility: HOSPITAL | Age: 38
LOS: 0 days | Discharge: HOME | End: 2023-01-24
Attending: EMERGENCY MEDICINE | Admitting: EMERGENCY MEDICINE
Payer: COMMERCIAL

## 2023-01-24 VITALS
TEMPERATURE: 99 F | OXYGEN SATURATION: 96 % | SYSTOLIC BLOOD PRESSURE: 150 MMHG | RESPIRATION RATE: 20 BRPM | HEART RATE: 67 BPM | DIASTOLIC BLOOD PRESSURE: 97 MMHG

## 2023-01-24 DIAGNOSIS — Z87.39 PERSONAL HISTORY OF OTHER DISEASES OF THE MUSCULOSKELETAL SYSTEM AND CONNECTIVE TISSUE: ICD-10-CM

## 2023-01-24 DIAGNOSIS — M54.2 CERVICALGIA: ICD-10-CM
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
## 2023-01-24 DIAGNOSIS — M54.6 PAIN IN THORACIC SPINE: ICD-10-CM

## 2023-01-24 DIAGNOSIS — Z98.1 ARTHRODESIS STATUS: ICD-10-CM
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
## 2023-01-24 DIAGNOSIS — M41.9 SCOLIOSIS, UNSPECIFIED: Chronic | ICD-10-CM

## 2023-01-24 PROCEDURE — 99284 EMERGENCY DEPT VISIT MOD MDM: CPT

## 2023-01-24 RX ORDER — METHOCARBAMOL 500 MG/1
1500 TABLET, FILM COATED ORAL ONCE
Refills: 0 | Status: COMPLETED | OUTPATIENT
Start: 2023-01-24 | End: 2023-01-24

## 2023-01-24 RX ORDER — KETOROLAC TROMETHAMINE 30 MG/ML
1 SYRINGE (ML) INJECTION
Qty: 6 | Refills: 0
Start: 2023-01-24 | End: 2023-01-25

## 2023-01-24 RX ORDER — KETOROLAC TROMETHAMINE 30 MG/ML
30 SYRINGE (ML) INJECTION ONCE
Refills: 0 | Status: DISCONTINUED | OUTPATIENT
Start: 2023-01-24 | End: 2023-01-24

## 2023-01-24 RX ORDER — METHOCARBAMOL 500 MG/1
2 TABLET, FILM COATED ORAL
Qty: 18 | Refills: 0
Start: 2023-01-24 | End: 2023-01-26

## 2023-01-24 RX ADMIN — METHOCARBAMOL 1500 MILLIGRAM(S): 500 TABLET, FILM COATED ORAL at 17:56

## 2023-01-24 RX ADMIN — Medication 30 MILLIGRAM(S): at 17:57

## 2023-01-24 NOTE — ED PROVIDER NOTE - NSDCPRINTRESULTS_ED_ALL_ED
Patient requests all Lab, Cardiology, and Radiology Results on their Discharge Instructions
Xray Chest 1 View- PORTABLE-Urgent

## 2023-01-24 NOTE — ED PROVIDER NOTE - NS ED ROS FT
Constitutional: (-) fever  Eyes/ENT: (-) blurry vision, (-) epistaxis  Cardiovascular: (-) chest pain, (-) syncope  Respiratory: (-) cough, (-) shortness of breath  Gastrointestinal: (-) vomiting, (-) diarrhea  Musculoskeletal: (+) neck pain, (+) upper back pain, (-) joint pain  Integumentary: (-) rash, (-) edema  Neurological: (-) headache, (-) altered mental status

## 2023-01-24 NOTE — ED PROVIDER NOTE - NSFOLLOWUPINSTRUCTIONS_ED_ALL_ED_FT

## 2023-01-24 NOTE — ED ADULT TRIAGE NOTE - ARRIVAL FROM
INTERVAL HPI/OVERNIGHT EVENTS:  chart reviewed  2 semi loose bms overnight per nursing    MEDICATIONS  (STANDING):  ascorbic acid 500 milliGRAM(s) Oral daily  budesonide 160 MICROgram(s)/formoterol 4.5 MICROgram(s) Inhaler 2 Puff(s) Inhalation two times a day  cholecalciferol 2000 Unit(s) Oral daily  cholestyramine Powder (Sugar-Free) 4 Gram(s) Oral every 24 hours  dexAMETHasone     Tablet 6 milliGRAM(s) Oral daily  enoxaparin Injectable 40 milliGRAM(s) SubCutaneous daily  famotidine    Tablet 20 milliGRAM(s) Oral two times a day  ferrous    sulfate 325 milliGRAM(s) Oral daily  influenza   Vaccine 0.5 milliLiter(s) IntraMuscular once  lactobacillus acidophilus 1 Tablet(s) Oral three times a day  lidocaine   Patch 1 Patch Transdermal every 24 hours  multivitamin/minerals 1 Tablet(s) Oral daily  potassium chloride    Tablet ER 10 milliEquivalent(s) Oral two times a day  pramipexole 1.5 milliGRAM(s) Oral every 24 hours  sulfaSALAzine 500 milliGRAM(s) Oral three times a day  torsemide 5 milliGRAM(s) Oral two times a day    MEDICATIONS  (PRN):  acetaminophen   Tablet .. 650 milliGRAM(s) Oral every 6 hours PRN Temp greater or equal to 38C (100.4F), Mild Pain (1 - 3)  ALBUTerol    90 MICROgram(s) HFA Inhaler 2 Puff(s) Inhalation every 4 hours PRN Shortness of Breath and/or Wheezing  aluminum hydroxide/magnesium hydroxide/simethicone Suspension 30 milliLiter(s) Oral every 4 hours PRN Dyspepsia  benzonatate 100 milliGRAM(s) Oral three times a day PRN Cough  cyclobenzaprine 5 milliGRAM(s) Oral three times a day PRN Muscle Spasm  melatonin 3 milliGRAM(s) Oral at bedtime PRN Insomnia  ondansetron Injectable 4 milliGRAM(s) IV Push every 6 hours PRN Nausea and/or Vomiting      Allergies    No Known Allergies    Intolerances        Review of Systems:    tele f/u      Vital Signs Last 24 Hrs  T(C): 36.5 (05 Feb 2021 12:11), Max: 36.6 (04 Feb 2021 20:34)  T(F): 97.7 (05 Feb 2021 12:11), Max: 97.8 (04 Feb 2021 20:34)  HR: 85 (05 Feb 2021 12:11) (64 - 85)  BP: 109/66 (05 Feb 2021 12:11) (101/59 - 109/66)  BP(mean): --  RR: 18 (05 Feb 2021 12:11) (17 - 18)  SpO2: 93% (05 Feb 2021 12:11) (93% - 95%)    PHYSICAL EXAM:    tele f/u    LABS:                        9.7    9.81  )-----------( 361      ( 05 Feb 2021 09:26 )             31.3     02-05    x   |  x   |  x   ----------------------------<  x   x    |  x   |  0.87      TPro  6.5  /  Alb  2.7<L>  /  TBili  0.2  /  DBili  <.10  /  AST  14<L>  /  ALT  12  /  AlkPhos  101  02-05          RADIOLOGY & ADDITIONAL TESTS:   Home

## 2023-01-24 NOTE — ED PROVIDER NOTE - CLINICAL SUMMARY MEDICAL DECISION MAKING FREE TEXT BOX
Exam normal.  No distress.  NSAIDs and supportive care.  DC home.  Strict return instructions discussed.

## 2023-01-24 NOTE — ED PROVIDER NOTE - PATIENT PORTAL LINK FT
You can access the FollowMyHealth Patient Portal offered by [facility] by registering at the following website: [email or web address]. By joining [employer]’s FollowMyHealth portal, you will also be able to view your health information using other applications (apps) compatible with our system.

## 2023-01-24 NOTE — ED PROVIDER NOTE - IV ALTEPLASE ADMIN OUTSIDE HIDDEN
Reason For Visit  ROSMERY MCCOY is an established patient here today for a chief complaint of itchy throat.Also requesting pregnancy test.   :  services not used.   A chaperone is not applicable. She is unaccompanied.        Quality    Adult Wellness CI height documented, discussion of regular exercise, exercising regularly, printed information given for activities, discussion of nutritional quality of diet, patient education given about proper diet, not using alcohol, not having considered quitting drinking, not getting angry when talked to about drinking, not having a drink or two in the morning to get going, not drinking alcohol regularly, and feeling guilty about it, no tobacco use, pap smear performed: 01/01/2018, does not have feelings of hopelessness (PHQ-2), no Anhedonia (PHQ-2), not referred to local mental health center, not taking medication for depression, monitoring patient and preventive medicine therapy for influenza.   Smoking Cessation CI no tobacco use.      History of Present Illness  Symptoms include cough, but no fever, no chills, no nasal passage blockage, no wheezing, no dyspnea, no shortness of breath, no hoarseness and no headache    The patient presents with complaints of gradual onset of constant episodes of moderate sore throat, described as dull, non-radiating. On a scale of 1 to 10, the patient rates the pain as 4. Episodes started 5 days ago. Symptoms are unchanged. Risk Factors: no exposure to strep and no exposure to mono.     LMP: the last menstrual period was 8/4/18.      Review of Systems    Const: Normal.   Allergy & Immunology: Normal.   Eyes: Normal.   ENT:. Per HPI.   CV: Normal.   Resp: Normal.   Breast: Normal.   GI: Normal.   : Normal.   Endo: Normal.   Heme/Lymph: Normal.   Musc: Normal.   Neuro: Normal.   Psych: Normal.   Skin: Normal.       Allergies  No Known Drug Allergies    Current Meds   1. Levothyroxine Sodium 125 MCG Oral Tablet; TAKE 1  TABLET DAILY AS DIRECTED;   Therapy: 13Mar2015 to (Evaluate:28Jun2019)  Requested for: 90Nyv7059; Last   Rx:[id number] Ordered   2. Meloxicam 15 MG Oral Tablet; TAKE 1 TABLET BY MOUTH DAILY WITH FOOD;   Therapy: 07Mar2018 to (Evaluate:65Dbk8499)  Requested for: 07Mar2018; Last   Rx:[id number] Ordered   3. Pantoprazole Sodium 40 MG Oral Tablet Delayed Release; take one tablet by mouth   every day;   Therapy: 78Yow3789 to (Evaluate:57Mwo9645)  Requested for: 27Kro1919; Last   Rx:[id number] Ordered   4. Vitamin D3 3000 UNIT Oral Tablet; TAKE 1 TABLET DAILY;   Therapy: 16Wlx8880 to (Evaluate:21Jan2017)  Requested for: 42Jnf6643; Last   Rx:[id number] Ordered    Active Problems  Atypical chest pain (R07.89)  Chronic headache (R51)   · Last Impression: 21 Nov 2016  a repeat CT scan of the head was or also told to get      refraction chked  MYRANDA EDEN (Primary Care)  Encounter for screening mammogram for high-risk patient (Z12.31)  Hypothyroidism (E03.9)   · Last Impression: 20 Feb 2017  Improving, continue same medication and monitor      showing.  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)  Need for hepatitis A vaccination (Z23)  Need for hepatitis B vaccination (Z23)  Need for MMR vaccine (Z23)  Need for Tdap vaccination (Z23)  Need for varicella vaccine (Z23)  Osteoarthritis, knee (M17.10)    Past Medical History  History of Ankle pain, unspecified laterality   · Last Impression: 24 Aug 2015  Etiology remains unclear I have ordered x-rays and      some blood work  MYRANDA EDEN (Primary Care)  History of Breast pain, right (N64.4)  History of Epigastric pain (R10.13)   · Last Impression: 21 Nov 2016  has resolved I have told her to continue her Protonix for      at least 2 more months  MYRANDA EDEN (Primary Care)  History of uterine leiomyoma (Z86.018)   · Last Impression: 21 Nov 2016  need to see gynecologist for further evaluation and      treatment  MYRANDA EDEN ([facility])  History of Leg pain (M79.606)   · Last Impression: 14 May 2015  Ultrasound was ordered again  MYRANDA EDEN      (Primary Care)  History of Pancytopenia (D61.818)   · Last Impression: 31 Aug 2015  Etiology unclear patient is being referred to hematologist       MYRANDA EDEN (Primary Care)  History of Pregnancy (Z34.90)  History of Preoperative clearance (Z01.818)  History of Right lower quadrant abdominal pain (R10.31)    Surgical History  no history of surgery    Family History  Mother   Family history of HTN (hypertension)  Father   Family history of Known health problems: none    Social History  Exercises regularly  Never used tobacco (Z78.9)  No alcohol use  No drug use    Review  Past medical history, problem list, family medical history, surgical history and social history reviewed.      Vitals  Signs   Recorded: 00Ctr8156 01:27PM   Height: 5 ft 5 in  Weight: 126 lb 15.68 oz  BMI Calculated: 21.13  BSA Calculated: 1.63  Systolic - Sittin, LUE, Sitting  Diastolic - Sittin, LUE, Sitting  Temperature: 98.8 F, Oral  Heart Rate: 82  Respiration: 18  O2 Saturation: 100  LMP: [id number]    Physical Exam    Vital signs reviewed.  Patient not in distress.  HENT- Atraumatic. Normocephalic.  Nasal congestion absent.  Ears- External canals normal.Tympanic membranes- bilaterally normal. Hearing normal bilaterally.  Pharynx- Oral mucosa pink and moist. Pharyngeal erythema present. . No exudate.Tonsils bilaterally normal.  No sinus tenderness.  Eyes- Pupils equal in size and reacting to light; Normal conjunctiva; No discharge; EOM within normal limits. No eyelid swelling. No ptosis.    Neck- supple. Normal Range of motion. No palpable cervical lymphadenopathy.  Respiratory- No respiratory distress. No accessory muscle use.Normal respiratory rate and effort. No chest tenderness.   Air entry bilaterally present and equal.  No rales/rhonchi/crepitations.  Skin - No rash.  Cardiovascular-Normal rate.  Regular rhythm. Heart sounds normal S1 S2.  No murmurs/thrill. No S3 or S4.  Gastrointestinal- soft, nontender, nondistended. Bowel sounds present and active. No hepatosplenomegaly.  Musculoskeletal- No joint tenderness. ROM of joints normal.No edema.  Neuro- Alert and oriented x 3 .  Gait is normal. No focal neurological deficit.     Results/Data     [id number] 01:39PM   [facility] PREGNANCY TEST- URINE, IN-OFFICE   MONOCLONAL PREGNANCY: Presumptive Negative     27 Aug 2018 1:39 PM    [facility] PREGNANCY TEST- URINE, IN-OFFICE        MONOCLONAL PREGNANCY Presumptive Negative    Immunizations  Hepatitis B --- Series1: 30-Mar-2016; Series2: 16-May-2016; Series3: 14-Sep-2016   Influenza --- Series1: 31-Aug-2015   MMR --- Series1: 22-Mar-2016; Series2: 28-Jul-2016   Tdap --- Series1: 22-Mar-2016   Varicella --- Series1: 11-Apr-2016; Series2: 28-Jul-2016     Assessment  Acute pharyngitis (J02.9)   · Assessed By: PREMA KEITH (Primary Care); Last Assessed: 27 Aug 2018   · Zpak prescription sent to pharmacy.      Explained sideffects of medications in detail ; and directions of use.  Menstrual periods irregular (N92.6)   · Assessed By: Moise Juarez; Last Assessed: 27 Aug 2018    Plan  Azithromycin 250 MG Oral Tablet; TAKE 2 TABLETS BY MOUTH AT ONCE TODAY,  THEN TAKE 1 TABLET BY MOUTH ONCE DAILY FOR 4 DAYS  [facility] PREGNANCY TEST- URINE, IN-OFFICE; [Do Not Release]; Status:Complete;   Done:  04Btu1445 01:39PM  Plans:     Plan:   Advised hydration and rest.  Advised warm saline gargles twice daily and lozenges as directed.  Return sooner if symptoms persist or worsen.  Advised cough syrup as directed  .   Medical compliance with plan discussed and risks of non-compliance reviewed.    Patient education completed on disease process, etiology & prognosis.    Patient expresses understanding of the plan.    Proper usage and side effects of medications reviewed & discussed.    Refer to orders.      Time    Total face to face time spent with  patient 25 minutes. Greater than 50% of the total time was spent counseling and/or coordinating care.      Signatures   Electronically signed by : ANALISA Collins; Aug 27 2018  1:30PM CST    Electronically signed by : PREMA KEITH M.D.; Aug 27 2018  3:09PM CST     show
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
## 2023-02-03 ENCOUNTER — EMERGENCY (EMERGENCY)
Facility: HOSPITAL | Age: 38
LOS: 0 days | Discharge: HOME | End: 2023-02-03
Attending: EMERGENCY MEDICINE | Admitting: EMERGENCY MEDICINE
Payer: MEDICAID

## 2023-02-03 VITALS
SYSTOLIC BLOOD PRESSURE: 124 MMHG | OXYGEN SATURATION: 100 % | HEART RATE: 88 BPM | DIASTOLIC BLOOD PRESSURE: 71 MMHG | TEMPERATURE: 99 F | RESPIRATION RATE: 20 BRPM
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
## 2023-02-03 DIAGNOSIS — S60.412A ABRASION OF RIGHT MIDDLE FINGER, INITIAL ENCOUNTER: ICD-10-CM
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
## 2023-02-03 DIAGNOSIS — M41.9 SCOLIOSIS, UNSPECIFIED: Chronic | ICD-10-CM
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
## 2023-02-03 DIAGNOSIS — Z23 ENCOUNTER FOR IMMUNIZATION: ICD-10-CM

## 2023-02-03 PROCEDURE — 99283 EMERGENCY DEPT VISIT LOW MDM: CPT

## 2023-02-03 RX ORDER — TETANUS TOXOID, REDUCED DIPHTHERIA TOXOID AND ACELLULAR PERTUSSIS VACCINE, ADSORBED 5; 2.5; 8; 8; 2.5 [IU]/.5ML; [IU]/.5ML; UG/.5ML; UG/.5ML; UG/.5ML
0.5 SUSPENSION INTRAMUSCULAR ONCE
Refills: 0 | Status: DISCONTINUED | OUTPATIENT
Start: 2023-02-03 | End: 2023-02-03

## 2023-02-03 RX ORDER — TETANUS TOXOID, REDUCED DIPHTHERIA TOXOID AND ACELLULAR PERTUSSIS VACCINE, ADSORBED 5; 2.5; 8; 8; 2.5 [IU]/.5ML; [IU]/.5ML; UG/.5ML; UG/.5ML; UG/.5ML
0.5 SUSPENSION INTRAMUSCULAR ONCE
Refills: 0 | Status: COMPLETED | OUTPATIENT
Start: 2023-02-03 | End: 2023-02-03

## 2023-02-03 RX ORDER — CEPHALEXIN 500 MG
1 CAPSULE ORAL
Qty: 28 | Refills: 0
Start: 2023-02-03 | End: 2023-02-09

## 2023-02-03 RX ADMIN — TETANUS TOXOID, REDUCED DIPHTHERIA TOXOID AND ACELLULAR PERTUSSIS VACCINE, ADSORBED 0.5 MILLILITER(S): 5; 2.5; 8; 8; 2.5 SUSPENSION INTRAMUSCULAR at 10:57

## 2023-02-03 NOTE — ED PROVIDER NOTE - ATTENDING APP SHARED VISIT CONTRIBUTION OF CARE
I personally evaluated the patient. I reviewed the Resident´s or Physician Assistant´s note (as assigned above), and agree with the findings and plan except as documented in my note.    38-year-old female presents to the emergency department complaining of animal bite to her right middle finger palmar aspect.  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  The bird is otherwise known to be normal and was participating in normal activities.  No medical problems, no other complaints.    Review of systems is otherwise unremarkable    GENERAL: female in no distress.   CHEST: normal work of breathing noted.   CV: pulses intact   EXTR: Abrasion noted to distal aspect of right middle finger palmar aspect.  Full range of motion, strength normal, no cellulitis, no bleeding  NEURO: AAO 3 no focal deficits      Impression: Animal bite/abrasion    Plan: Wound management, antibiotics, outpatient management, reassurance

## 2023-02-03 NOTE — ED PROVIDER NOTE - OBJECTIVE STATEMENT
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  Patient denies weakness, numbness, fever, chills, drainage

## 2023-02-03 NOTE — ED PROVIDER NOTE - PATIENT PORTAL LINK FT
You can access the FollowMyHealth Patient Portal offered by [facility] by registering at the following website: [email or web address]. By joining [email or web address]’s FollowMyHealth portal, you will also be able to view your health information using other applications (apps) compatible with our system.

## 2023-02-03 NOTE — ED PROVIDER NOTE - CLINICAL SUMMARY MEDICAL DECISION MAKING FREE TEXT BOX
38-year-old female presents to the emergency department complaining of bite to her right third finger from a parakeet at her place of work.  Had wound management, will start antibiotics and discharge as outpatient.

## 2023-02-03 NOTE — ED ADULT NURSE NOTE - PRIMARY CARE PROVIDER
Pt is still feeling bad, she is still on antibiotic, she is coughing up phlegm. She will stay off work tomorrow.   She is getting better but she wants to know what this is. pcp

## 2023-02-03 NOTE — ED PROVIDER NOTE - PHYSICAL EXAMINATION
VITAL SIGNS: I have reviewed nursing notes and confirm.  CONSTITUTIONAL: Well-developed; well-nourished; in no acute distress.   SKIN: small scab to palmar aspect of right 3rd digit  Remainder of skin exam is warm and dry, no acute rash.    HEAD: Normocephalic; atraumatic.  EYES:  conjunctiva and sclera clear.  ENT: No nasal discharge; airway clear.  EXT: Normal ROM.  No clubbing, cyanosis or edema.   NEURO: Alert, oriented, grossly unremarkable

## 2023-02-03 NOTE — ED PROVIDER NOTE - NS ED ROS FT
Neuro:  No headache or weakness.  No LOC.  Skin: + bite  Endocrine: No history of thyroid disease or diabetes.  Except as documented in the HPI,  all other systems are negative.

## 2023-06-19 ENCOUNTER — APPOINTMENT (OUTPATIENT)
Dept: OBGYN | Facility: CLINIC | Age: 38
End: 2023-06-19
Payer: MEDICAID

## 2023-06-19 DIAGNOSIS — Z01.419 ENCOUNTER FOR GYNECOLOGICAL EXAMINATION (GENERAL) (ROUTINE) W/OUT ABNORMAL FINDINGS: ICD-10-CM

## 2023-06-19 DIAGNOSIS — N92.6 IRREGULAR MENSTRUATION, UNSPECIFIED: ICD-10-CM

## 2023-06-19 PROCEDURE — 99395 PREV VISIT EST AGE 18-39: CPT

## 2023-06-19 PROCEDURE — 99213 OFFICE O/P EST LOW 20 MIN: CPT | Mod: 25

## 2023-06-19 NOTE — HISTORY OF PRESENT ILLNESS
[FreeTextEntry1] : --   39 Y/O  LMP 23 HERE FOR CHECK UP.PT ALSO C/O HOT FLASHES AND HAVING IRREG MENSES.\par PMHX;SCOLIOSIS\par PSHX;BACK SURGERY  LEEP\par SOCIAL HX;-ETOH -CIGG \par STD; HPV  /          DISCUSSED CONDOMS\par FAMILY HISTORY OF BREAST CANCER;NO\par REVIEW OF SYMPTOMS DONE;\par ALLERGIES; pt answered NKDA\par Medication reconciliation was completed by reviewing, with the patient's\par involvement, the patient's current outpatient medications and those \par ordered for the patient today. \par  X 4\par        \par PE;BREASTS;- MASSES DC NODES SBE\par ABDOMEN;SOFT NT ND\par NL GENITALIA\par VAGINA -DC \par CERVIX;-CMT\par UTERUS;  TOP NL  SIZE NT \par ADNEXA; NT - MASSES\par \par A;P; CHECK UP,HOT FLASHES,IRREG MENSES\par -PAP GC CHLAMYDIA\par -HORMONAL PANEL\par -SONO\par -F-U AFTER ABOVE.

## 2023-06-20 LAB
ESTRADIOL SERPL-MCNC: 255 PG/ML
FSH SERPL-MCNC: 2.5 IU/L
HCG SERPL-MCNC: <1 MIU/ML
LH SERPL-ACNC: 5.2 IU/L
PROGEST SERPL-MCNC: 7.3 NG/ML
PROLACTIN SERPL-MCNC: 11.8 NG/ML
TESTOST SERPL-MCNC: 56.5 NG/DL
TSH SERPL-ACNC: 1.78 UIU/ML

## 2023-06-22 ENCOUNTER — NON-APPOINTMENT (OUTPATIENT)
Age: 38
End: 2023-06-22

## 2023-06-22 LAB
C TRACH RRNA SPEC QL NAA+PROBE: NOT DETECTED
CYTOLOGY CVX/VAG DOC THIN PREP: ABNORMAL
HPV HIGH+LOW RISK DNA PNL CVX: NOT DETECTED
N GONORRHOEA RRNA SPEC QL NAA+PROBE: NOT DETECTED
SOURCE AMPLIFICATION: NORMAL

## 2023-06-23 RX ORDER — METRONIDAZOLE 500 MG/1
500 TABLET ORAL TWICE DAILY
Qty: 14 | Refills: 0 | Status: ACTIVE | COMMUNITY
Start: 2023-06-22 | End: 1900-01-01

## 2023-07-06 ENCOUNTER — APPOINTMENT (OUTPATIENT)
Dept: OBGYN | Facility: CLINIC | Age: 38
End: 2023-07-06
Payer: MEDICAID

## 2023-07-06 DIAGNOSIS — N95.1 MENOPAUSAL AND FEMALE CLIMACTERIC STATES: ICD-10-CM

## 2023-07-06 PROCEDURE — 76830 TRANSVAGINAL US NON-OB: CPT

## 2023-07-06 PROCEDURE — 99213 OFFICE O/P EST LOW 20 MIN: CPT

## 2023-07-06 NOTE — HISTORY OF PRESENT ILLNESS
[FreeTextEntry1] : --   37 Y/O  HERE FOR F-U;\par SONO AND HORMONAL PANEL REVIEWED.\par P.PT ALSO C/O HOT FLASHES AND HAVING IRREG MENSES.\par PMHX;SCOLIOSIS\par PSHX;BACK SURGERY  LEEP\par SOCIAL HX;-ETOH -CIGG \par STD; HPV  /          DISCUSSED CONDOMS\par FAMILY HISTORY OF BREAST CANCER;NO\par REVIEW OF SYMPTOMS DONE;\par ALLERGIES; pt answered NKDA\par Medication reconciliation was completed by reviewing, with the patient's\par involvement, the patient's current outpatient medications and those \par ordered for the patient today. \par  X 4\par        \par PE;\par ABDOMEN;SOFT NT ND\par EXT -CCE\par \par A;P; HOT FLASHES,I HX OF RREG MENSES\par --REASSURED PT\par -PT WILL MAKE APPT WITH PRIMARY CARE\par -ANSWERED QUESTIONS.

## 2023-08-11 ENCOUNTER — APPOINTMENT (OUTPATIENT)
Dept: INTERNAL MEDICINE | Facility: CLINIC | Age: 38
End: 2023-08-11

## 2024-08-13 ENCOUNTER — NON-APPOINTMENT (OUTPATIENT)
Age: 39
End: 2024-08-13

## 2024-08-13 ENCOUNTER — APPOINTMENT (OUTPATIENT)
Dept: OBGYN | Facility: CLINIC | Age: 39
End: 2024-08-13
Payer: MEDICAID

## 2024-08-13 VITALS
HEIGHT: 65 IN | WEIGHT: 129 LBS | SYSTOLIC BLOOD PRESSURE: 135 MMHG | BODY MASS INDEX: 21.49 KG/M2 | DIASTOLIC BLOOD PRESSURE: 92 MMHG

## 2024-08-13 DIAGNOSIS — Z01.419 ENCOUNTER FOR GYNECOLOGICAL EXAMINATION (GENERAL) (ROUTINE) W/OUT ABNORMAL FINDINGS: ICD-10-CM

## 2024-08-13 PROCEDURE — 99395 PREV VISIT EST AGE 18-39: CPT

## 2024-08-13 NOTE — HISTORY OF PRESENT ILLNESS
[FreeTextEntry1] : --   38 Y/O  HERE FOR  ANNUAL EXAM.LMP 24 PMHX;SCOLIOSIS PSHX;BACK SURGERY  LEEP SOCIAL HX;-ETOH -CIGG  STD; HPV  /          DISCUSSED CONDOMS FAMILY HISTORY OF BREAST CANCER;MATERNAL GM REVIEW OF SYMPTOMS DONE; ALLERGIES; pt answered NKDA Medication reconciliation was completed by reviewing, with the patient's involvement, the patient's current outpatient medications and those  ordered for the patient today.   X 4         PE;BREASTS;-MASSES DC NODES SBE ABDOMEN;SOFT NT ND NL GENITALIA VAGINA -DC CX-CMT UTERUS NL SZIE NT ADNEXA NT - MASSES EXT -CCE  A;P; ANNUAL EXAM -PAP GC CHLAMYDIA -F-U PRN.

## 2024-08-19 LAB
CYTOLOGY CVX/VAG DOC THIN PREP: ABNORMAL
HPV HIGH+LOW RISK DNA PNL CVX: NOT DETECTED

## 2024-08-27 ENCOUNTER — TRANSCRIPTION ENCOUNTER (OUTPATIENT)
Age: 39
End: 2024-08-27

## 2025-01-10 ENCOUNTER — EMERGENCY (EMERGENCY)
Facility: HOSPITAL | Age: 40
LOS: 0 days | Discharge: HOME | End: 2025-01-10
Attending: STUDENT IN AN ORGANIZED HEALTH CARE EDUCATION/TRAINING PROGRAM
Payer: MEDICAID

## 2025-01-10 VITALS
SYSTOLIC BLOOD PRESSURE: 136 MMHG | RESPIRATION RATE: 16 BRPM | HEART RATE: 86 BPM | OXYGEN SATURATION: 98 % | TEMPERATURE: 98 F | DIASTOLIC BLOOD PRESSURE: 94 MMHG | WEIGHT: 128.97 LBS

## 2025-01-10 DIAGNOSIS — J02.9 ACUTE PHARYNGITIS, UNSPECIFIED: ICD-10-CM

## 2025-01-10 DIAGNOSIS — R19.7 DIARRHEA, UNSPECIFIED: ICD-10-CM

## 2025-01-10 DIAGNOSIS — R11.2 NAUSEA WITH VOMITING, UNSPECIFIED: ICD-10-CM

## 2025-01-10 DIAGNOSIS — M41.9 SCOLIOSIS, UNSPECIFIED: Chronic | ICD-10-CM

## 2025-01-10 LAB
ALBUMIN SERPL ELPH-MCNC: 4.8 G/DL — SIGNIFICANT CHANGE UP (ref 3.5–5.2)
ALP SERPL-CCNC: 66 U/L — SIGNIFICANT CHANGE UP (ref 30–115)
ALT FLD-CCNC: 10 U/L — SIGNIFICANT CHANGE UP (ref 0–41)
ANION GAP SERPL CALC-SCNC: 11 MMOL/L — SIGNIFICANT CHANGE UP (ref 7–14)
AST SERPL-CCNC: 15 U/L — SIGNIFICANT CHANGE UP (ref 0–41)
BASOPHILS # BLD AUTO: 0 K/UL — SIGNIFICANT CHANGE UP (ref 0–0.2)
BASOPHILS NFR BLD AUTO: 0 % — SIGNIFICANT CHANGE UP (ref 0–1)
BILIRUB SERPL-MCNC: 0.9 MG/DL — SIGNIFICANT CHANGE UP (ref 0.2–1.2)
BUN SERPL-MCNC: 9 MG/DL — LOW (ref 10–20)
CALCIUM SERPL-MCNC: 9.1 MG/DL — SIGNIFICANT CHANGE UP (ref 8.4–10.5)
CHLORIDE SERPL-SCNC: 103 MMOL/L — SIGNIFICANT CHANGE UP (ref 98–110)
CO2 SERPL-SCNC: 25 MMOL/L — SIGNIFICANT CHANGE UP (ref 17–32)
CREAT SERPL-MCNC: 0.6 MG/DL — LOW (ref 0.7–1.5)
EGFR: 118 ML/MIN/1.73M2 — SIGNIFICANT CHANGE UP
EOSINOPHIL # BLD AUTO: 0 K/UL — SIGNIFICANT CHANGE UP (ref 0–0.7)
EOSINOPHIL NFR BLD AUTO: 0 % — SIGNIFICANT CHANGE UP (ref 0–8)
GLUCOSE SERPL-MCNC: 89 MG/DL — SIGNIFICANT CHANGE UP (ref 70–99)
HCG SERPL QL: NEGATIVE — SIGNIFICANT CHANGE UP
HCT VFR BLD CALC: 37.8 % — SIGNIFICANT CHANGE UP (ref 37–47)
HGB BLD-MCNC: 13.2 G/DL — SIGNIFICANT CHANGE UP (ref 12–16)
LIDOCAIN IGE QN: 29 U/L — SIGNIFICANT CHANGE UP (ref 7–60)
LYMPHOCYTES # BLD AUTO: 1.5 K/UL — SIGNIFICANT CHANGE UP (ref 1.2–3.4)
LYMPHOCYTES # BLD AUTO: 17.4 % — LOW (ref 20.5–51.1)
MAGNESIUM SERPL-MCNC: 1.6 MG/DL — LOW (ref 1.8–2.4)
MANUAL SMEAR VERIFICATION: SIGNIFICANT CHANGE UP
MCHC RBC-ENTMCNC: 32.8 PG — HIGH (ref 27–31)
MCHC RBC-ENTMCNC: 34.9 G/DL — SIGNIFICANT CHANGE UP (ref 32–37)
MCV RBC AUTO: 93.8 FL — SIGNIFICANT CHANGE UP (ref 81–99)
MONOCYTES # BLD AUTO: 0.67 K/UL — HIGH (ref 0.1–0.6)
MONOCYTES NFR BLD AUTO: 7.8 % — SIGNIFICANT CHANGE UP (ref 1.7–9.3)
NEUTROPHILS # BLD AUTO: 6 K/UL — SIGNIFICANT CHANGE UP (ref 1.4–6.5)
NEUTROPHILS NFR BLD AUTO: 69.6 % — SIGNIFICANT CHANGE UP (ref 42.2–75.2)
PLAT MORPH BLD: NORMAL — SIGNIFICANT CHANGE UP
PLATELET # BLD AUTO: 156 K/UL — SIGNIFICANT CHANGE UP (ref 130–400)
PMV BLD: 11.5 FL — HIGH (ref 7.4–10.4)
POLYCHROMASIA BLD QL SMEAR: SLIGHT — SIGNIFICANT CHANGE UP
POTASSIUM SERPL-MCNC: 3.8 MMOL/L — SIGNIFICANT CHANGE UP (ref 3.5–5)
POTASSIUM SERPL-SCNC: 3.8 MMOL/L — SIGNIFICANT CHANGE UP (ref 3.5–5)
PROT SERPL-MCNC: 7.1 G/DL — SIGNIFICANT CHANGE UP (ref 6–8)
RBC # BLD: 4.03 M/UL — LOW (ref 4.2–5.4)
RBC # FLD: 13.2 % — SIGNIFICANT CHANGE UP (ref 11.5–14.5)
RBC BLD AUTO: ABNORMAL
SODIUM SERPL-SCNC: 139 MMOL/L — SIGNIFICANT CHANGE UP (ref 135–146)
VARIANT LYMPHS # BLD: 5.2 % — HIGH (ref 0–5)
WBC # BLD: 8.62 K/UL — SIGNIFICANT CHANGE UP (ref 4.8–10.8)
WBC # FLD AUTO: 8.62 K/UL — SIGNIFICANT CHANGE UP (ref 4.8–10.8)

## 2025-01-10 PROCEDURE — 84703 CHORIONIC GONADOTROPIN ASSAY: CPT

## 2025-01-10 PROCEDURE — 96374 THER/PROPH/DIAG INJ IV PUSH: CPT

## 2025-01-10 PROCEDURE — 99284 EMERGENCY DEPT VISIT MOD MDM: CPT | Mod: 25

## 2025-01-10 PROCEDURE — 36415 COLL VENOUS BLD VENIPUNCTURE: CPT

## 2025-01-10 PROCEDURE — 99285 EMERGENCY DEPT VISIT HI MDM: CPT

## 2025-01-10 PROCEDURE — 83690 ASSAY OF LIPASE: CPT

## 2025-01-10 PROCEDURE — 80053 COMPREHEN METABOLIC PANEL: CPT

## 2025-01-10 PROCEDURE — 96375 TX/PRO/DX INJ NEW DRUG ADDON: CPT

## 2025-01-10 PROCEDURE — 83735 ASSAY OF MAGNESIUM: CPT

## 2025-01-10 PROCEDURE — 85025 COMPLETE CBC W/AUTO DIFF WBC: CPT

## 2025-01-10 RX ORDER — SODIUM CHLORIDE 9 MG/ML
1000 INJECTION, SOLUTION INTRAMUSCULAR; INTRAVENOUS; SUBCUTANEOUS ONCE
Refills: 0 | Status: COMPLETED | OUTPATIENT
Start: 2025-01-10 | End: 2025-01-10

## 2025-01-10 RX ORDER — ONDANSETRON 4 MG/1
4 TABLET ORAL ONCE
Refills: 0 | Status: COMPLETED | OUTPATIENT
Start: 2025-01-10 | End: 2025-01-10

## 2025-01-10 RX ORDER — MAGNESIUM SULFATE 500 MG/ML
2 INJECTION, SOLUTION INTRAMUSCULAR; INTRAVENOUS ONCE
Refills: 0 | Status: COMPLETED | OUTPATIENT
Start: 2025-01-10 | End: 2025-01-10

## 2025-01-10 RX ADMIN — MAGNESIUM SULFATE 25 GRAM(S): 500 INJECTION, SOLUTION INTRAMUSCULAR; INTRAVENOUS at 18:02

## 2025-01-10 RX ADMIN — SODIUM CHLORIDE 1000 MILLILITER(S): 9 INJECTION, SOLUTION INTRAMUSCULAR; INTRAVENOUS; SUBCUTANEOUS at 15:53

## 2025-01-10 RX ADMIN — ONDANSETRON 4 MILLIGRAM(S): 4 TABLET ORAL at 15:53

## 2025-01-10 NOTE — ED PROVIDER NOTE - CLINICAL SUMMARY MEDICAL DECISION MAKING FREE TEXT BOX
39-year-old female, no pertinent past medical history, presenting for 3 days of intermittent sore throat, body aches, nausea, vomiting, abdominal discomfort, no alleviating or aggravating factors, nonradiating.  Denies fevers, chills, chest pain, shortness of breath, diarrhea, back pain, urinary symptoms. 39-year-old female, no pertinent past medical history, presenting for 3 days of intermittent sore throat, body aches, nausea, vomiting, abdominal discomfort, diarrhea, no alleviating or aggravating factors, nonradiating.  Denies fevers, chills, chest pain, shortness of breath, diarrhea, back pain, urinary symptoms.  Labs were ordered and reviewed.  Appropriate medications for patient's presenting complaints were ordered and effects were reassessed.  Discussed all results with patient.  Magnesium given.  Given return precautions and follow up outpatient.  Patient feels improved and is comfortable with plan. 39-year-old female, no pertinent past medical history, presenting for 3 days of intermittent sore throat, body aches, nausea, vomiting, abdominal discomfort, diarrhea, no alleviating or aggravating factors, nonradiating.  Denies fevers, chills, chest pain, shortness of breath, diarrhea, back pain, urinary symptoms.  Labs were ordered and reviewed.  Appropriate medications for patient's presenting complaints were ordered and effects were reassessed.  Discussed all results with patient.  Magnesium given.  Given return precautions and follow up outpatient.  Patient feels improved and is comfortable with plan. Tolerated PO.

## 2025-01-10 NOTE — ED ADULT NURSE NOTE - OBJECTIVE STATEMENT
pt c/o nausea vomiting diarrhea cough and sore throat for 2 days.  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

## 2025-01-10 NOTE — ED PROVIDER NOTE - NSFOLLOWUPINSTRUCTIONS_ED_ALL_ED_FT
Acute Nausea and Vomiting    WHAT YOU NEED TO KNOW:    Acute nausea and vomiting start suddenly, worsen quickly, and last a short time.    DISCHARGE INSTRUCTIONS:    Return to the emergency department if:     You see blood in your vomit or your bowel movements.      You have sudden, severe pain in your chest and upper abdomen after hard vomiting or retching.      You have swelling in your neck and chest.       You are dizzy, cold, and thirsty and your eyes and mouth are dry.      You are urinating very little or not at all.      You have muscle weakness, leg cramps, and trouble breathing.       Your heart is beating much faster than normal.       You continue to vomit for more than 48 hours.     Contact your healthcare provider if:     You have frequent dry heaves (vomiting but nothing comes out).      Your nausea and vomiting does not get better or go away after you use medicine.      You have questions or concerns about your condition or treatment.    Medicines: You may need any of the following:     Medicines may be given to calm your stomach and stop your vomiting. You may also need medicines to help you feel more relaxed or to stop nausea and vomiting caused by motion sickness.      Gastrointestinal stimulants are used to help empty your stomach and bowels. This may help decrease nausea and vomiting.      Take your medicine as directed. Contact your healthcare provider if you think your medicine is not helping or if you have side effects. Tell him or her if you are allergic to any medicine. Keep a list of the medicines, vitamins, and herbs you take. Include the amounts, and when and why you take them. Bring the list or the pill bottles to follow-up visits. Carry your medicine list with you in case of an emergency.    Prevent or manage acute nausea and vomiting:     Do not drink alcohol. Alcohol may upset or irritate your stomach. Too much alcohol can also cause acute nausea and vomiting.      Control stress. Headaches due to stress may cause nausea and vomiting. Find ways to relax and manage your stress. Get more rest and sleep.      Drink more liquids as directed. Vomiting can lead to dehydration. It is important to drink more liquids to help replace lost body fluids. Ask your healthcare provider how much liquid to drink each day and which liquids are best for you. Your provider may recommend that you drink an oral rehydration solution (ORS). ORS contains water, salts, and sugar that are needed to replace the lost body fluids. Ask what kind of ORS to use, how much to drink, and where to get it.      Eat smaller meals, more often. Eat small amounts of food every 2 to 3 hours, even if you are not hungry. Food in your stomach may decrease your nausea.      Talk to your healthcare provider before you take over-the-counter (OTC) medicines. These medicines can cause serious problems if you use certain other medicines, or you have a medical condition. You may have problems if you use too much or use them for longer than the label says. Follow directions on the label carefully.     Follow up with your healthcare provider as directed: Write down your questions so you remember to ask them during your follow-up visits.       © Copyright [employer] 2019 All illustrations and images included in CareNotes are the copyrighted property of [employer], [employer] or [employer].      Diarrhea    Diarrhea is frequent loose and watery bowel movements that has many causes. Diarrhea can make you feel weak and cause you to become dehydrated. Diarrhea typically lasts 2–3 days. However, it can last longer if it is a sign of something more serious. Drink clear fluids to prevent dehydration. Eat bland, easy-to-digest foods as tolerated.     SEEK IMMEDIATE MEDICAL CARE IF YOU HAVE THE FOLLOWING SYMPTOMS: fever, lightheadedness/dizziness, chest pain, black or bloody stools, shortness of breath, severe abdominal or back pain, or any signs of dehydration.

## 2025-01-10 NOTE — ED PROVIDER NOTE - ATTENDING APP SHARED VISIT CONTRIBUTION OF CARE
39-year-old female, no pertinent past medical history, presenting for 3 days of intermittent sore throat, body aches, nausea, vomiting, abdominal discomfort, no alleviating or aggravating factors, nonradiating.  Denies fevers, chills, chest pain, shortness of breath, diarrhea, back pain, urinary symptoms.    CONSTITUTIONAL: Well-developed; well-nourished; in no acute distress.   SKIN: warm, dry  HEAD: Normocephalic  EYES: no conjunctival erythema  ENT: No nasal discharge; airway clear.  NECK: Supple; non tender.  RESP: no increased respiratory effort  ABD: soft ntnd  EXT: Normal ROM.  No clubbing, cyanosis or edema.   NEURO: Alert, oriented, grossly unremarkable  PSYCH: Cooperative, appropriate.

## 2025-01-10 NOTE — ED ADULT NURSE NOTE - NS ED NURSE LEVEL OF CONSCIOUSNESS SPEECH
Speaking Coherently Metronidazole Pregnancy And Lactation Text: This medication is Pregnancy Category B and considered safe during pregnancy.  It is also excreted in breast milk.

## 2025-01-10 NOTE — ED PROVIDER NOTE - PATIENT PORTAL LINK FT
You can access the FollowMyHealth Patient Portal offered by [facility] by registering at the following website: [email or web address]. By joining "[employer]’s FollowMyHealth portal, you will also be able to view your health information using other applications (apps) compatible with our system.

## 2025-01-10 NOTE — ED PROVIDER NOTE - OBJECTIVE STATEMENT
39-year-old female with no past medical history presents to ED with nausea vomiting diarrhea cough and sore throat for 2 days.  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  No fever body aches chest pain shortness of breath or abdominal pain.

## 2025-08-04 ENCOUNTER — NON-APPOINTMENT (OUTPATIENT)
Age: 40
End: 2025-08-04

## 2025-09-09 ENCOUNTER — APPOINTMENT (OUTPATIENT)
Dept: OBGYN | Facility: CLINIC | Age: 40
End: 2025-09-09
Payer: MEDICAID

## 2025-09-09 DIAGNOSIS — Z01.419 ENCOUNTER FOR GYNECOLOGICAL EXAMINATION (GENERAL) (ROUTINE) W/OUT ABNORMAL FINDINGS: ICD-10-CM

## 2025-09-09 PROCEDURE — 99459 PELVIC EXAMINATION: CPT

## 2025-09-09 PROCEDURE — 99395 PREV VISIT EST AGE 18-39: CPT

## 2025-09-11 LAB
C TRACH RRNA SPEC QL NAA+PROBE: NOT DETECTED
HPV HIGH+LOW RISK DNA PNL CVX: NOT DETECTED
N GONORRHOEA RRNA SPEC QL NAA+PROBE: NOT DETECTED
SOURCE TP AMPLIFICATION: NORMAL